# Patient Record
Sex: FEMALE | Race: ASIAN | NOT HISPANIC OR LATINO | Employment: OTHER | URBAN - METROPOLITAN AREA
[De-identification: names, ages, dates, MRNs, and addresses within clinical notes are randomized per-mention and may not be internally consistent; named-entity substitution may affect disease eponyms.]

---

## 2018-08-16 ENCOUNTER — OFFICE VISIT (OUTPATIENT)
Dept: FAMILY MEDICINE | Facility: CLINIC | Age: 63
End: 2018-08-16
Payer: MEDICAID

## 2018-08-16 VITALS
TEMPERATURE: 98.4 F | HEIGHT: 56 IN | WEIGHT: 205 LBS | SYSTOLIC BLOOD PRESSURE: 198 MMHG | DIASTOLIC BLOOD PRESSURE: 128 MMHG | HEART RATE: 85 BPM | BODY MASS INDEX: 46.12 KG/M2 | OXYGEN SATURATION: 96 %

## 2018-08-16 DIAGNOSIS — M17.0 OSTEOARTHRITIS OF BOTH KNEES, UNSPECIFIED OSTEOARTHRITIS TYPE: ICD-10-CM

## 2018-08-16 DIAGNOSIS — I10 ESSENTIAL HYPERTENSION, BENIGN: Primary | ICD-10-CM

## 2018-08-16 LAB
BUN SERPL-MCNC: 15 MG/DL (ref 7–30)
CALCIUM SERPL-MCNC: 9.6 MG/DL (ref 8.5–10.4)
CHLORIDE SERPLBLD-SCNC: 106 MMOL/L (ref 94–109)
CO2 SERPL-SCNC: 28 MMOL/L (ref 20–32)
CREAT SERPL-MCNC: 1 MG/DL (ref 0.6–1.3)
EGFR CALCULATED (BLACK REFERENCE): 71.6 ML/MIN
EGFR CALCULATED (NON BLACK REFERENCE): 59.1 ML/MIN
GLUCOSE SERPL-MCNC: 114 MG/DL (ref 60–109)
HCT VFR BLD AUTO: 45.7 % (ref 35–47)
HEMOGLOBIN: 14.8 G/DL (ref 11.7–15.7)
MCH RBC QN AUTO: 31.7 PG (ref 26.5–35)
MCHC RBC AUTO-ENTMCNC: 32.4 G/DL (ref 32–36)
MCV RBC AUTO: 97.9 FL (ref 78–100)
PLATELET # BLD AUTO: 137 K/UL (ref 150–450)
POTASSIUM SERPL-SCNC: 3.6 MMOL/L (ref 3.4–5.3)
RBC # BLD AUTO: 4.67 M/UL (ref 3.8–5.2)
SODIUM SERPL-SCNC: 142 MMOL/L (ref 133–144)
WBC # BLD AUTO: 9.2 K/UL (ref 4–11)

## 2018-08-16 RX ORDER — NAPROXEN 500 MG/1
500 TABLET ORAL 2 TIMES DAILY PRN
Qty: 30 TABLET | Refills: 1 | Status: SHIPPED | OUTPATIENT
Start: 2018-08-16 | End: 2018-09-13

## 2018-08-16 RX ORDER — LISINOPRIL AND HYDROCHLOROTHIAZIDE 12.5; 2 MG/1; MG/1
1 TABLET ORAL DAILY
Qty: 90 TABLET | Refills: 1 | Status: SHIPPED | OUTPATIENT
Start: 2018-08-16 | End: 2020-02-19

## 2018-08-16 NOTE — NURSING NOTE
Due to patient being non-English speaking/uses sign language, an  was used for this visit. Only for face-to-face interpretation by an external agency, date and length of interpretation can be found on the scanned worksheet.     name: carito weiner  Agency: [employer]  Language: Hmong   Telephone number: [phone]  Type of interpretation: Face-to-face, spoken    mammo and colonoscopy declined

## 2018-08-16 NOTE — MR AVS SNAPSHOT
After Visit Summary   8/16/2018    Konrad Mcarthur    MRN: [id number]           Patient Information     Date Of Birth          1955        Visit Information        Provider Department      8/16/2018 10:00 AM Nick Ramirez MD [facility]        Today's Diagnoses     Essential hypertension, benign    -  1    Osteoarthritis of both knees, unspecified osteoarthritis type          Care Instructions    Blood pressure-   Today you blood pressure is very high. We need to get this under control and prevent strokes and other problems in the future. We are sending a new medicine over to your pharmacy. Take 1 tablet in the morning every day    Knee pian-   We are prescribing naproxen for you knee pain. Take this as directed for 10 days and see how it feels     Follow up in 1 month for blood pressure recheck     ~~~~~~~~~~~~~~~~~~~~~~~~~~~~  Your medication list is printed, please keep this with you, it is helpful to bring this current list to any other medical appointments, the emergency room or hospital.    If you had lab testing today and your results are reassuring or normal they will be be mailed to you within 7 days.     If the lab tests need quick action we will call you with the results.   The phone number we will call with results is # [phone] (home) . If this is not the best number please call our clinic and change the number.    If you need any refills please call your pharmacy and they will contact us.    If you have any further concerns or wish to schedule another appointment you must call our office during normal business hours  [phone] (8-5:00 M-F)  If you have urgent medical questions that cannot wait  you may also call [phone] at any time of day.  If you have a medical emergency please call [phone].    Thank you for coming to [facility].            Follow-ups after your visit        Your next 10 appointments already scheduled     Sep 13, 2018 10:20 AM YVETTE  "  Return Visit with Nick Ramirez MD   [facility] ([facility])    [address] [place] [id number]   [phone]              Who to contact     Please call your clinic at [phone] to:    Ask questions about your health    Make or cancel appointments    Discuss your medicines    Learn about your test results    Speak to your doctor            Additional Information About Your Visit        Care EveryWhere ID     This is your Care EveryWhere ID. This could be used by other organizations to access your [facility] medical records  [id number]        Your Vitals Were     Pulse Temperature Height Pulse Oximetry BMI (Body Mass Index)       85 98.4  F (36.9  C) (Oral) 4' 8.3\" (143 cm) 96% 45.47 kg/m2        Blood Pressure from Last 3 Encounters:   08/16/18 (!) 198/128   06/08/16 (!) 178/110   05/24/16 (!) 162/102    Weight from Last 3 Encounters:   08/16/18 205 lb (93 kg)   06/08/16 198 lb 12.8 oz (90.2 kg)   05/24/16 197 lb 6.4 oz (89.5 kg)              We Performed the Following     Basic Metabolic Panel ([facility]) - Results < 1 hr     CBC with Plt ([facility])          Today's Medication Changes          These changes are accurate as of 8/16/18 11:14 AM.  If you have any questions, ask your nurse or doctor.               Start taking these medicines.        Dose/Directions    lisinopril-hydrochlorothiazide 20-12.5 MG per tablet   Commonly known as:  PRINZIDE/ZESTORETIC   Used for:  Essential hypertension, benign   Started by:  Nick aRmirez MD        Dose:  1 tablet   Take 1 tablet by mouth daily   Quantity:  90 tablet   Refills:  1       naproxen 500 MG tablet   Commonly known as:  NAPROSYN   Used for:  Osteoarthritis of both knees, unspecified osteoarthritis type   Started by:  Nick Ramirez MD        Dose:  500 mg   Take 1 tablet (500 mg) by mouth 2 times daily as needed for moderate pain   Quantity:  30 tablet   Refills:  1            Where to get your medicines      These " medications were sent to [facility] - [place], [place] - [address]  [address], [facility] [address]     Phone:  [phone]     lisinopril-hydrochlorothiazide 20-12.5 MG per tablet    naproxen 500 MG tablet                Primary Care Provider Office Phone # Fax #    Delmy Koenig [phone]       [facility] [address] [place] [address]        Equal Access to Services     SUAD LUCIA : Hadii aad ku hadasho Soomaali, waaxda luqadaha, qaybta kaalmada adeegyada, waxay idiin hayaan adeeg kharash laarmen penaloza. So [facility] [phone].    ATENCIÓN: Si habla español, tiene a paz disposición servicios gratuitos de asistencia lingüística. [facility] [phone].    We comply with applicable federal civil rights laws and [place] laws. We do not discriminate on the basis of race, color, national origin, age, disability, sex, sexual orientation, or gender identity.            Thank you!     Thank you for choosing [facility]  for your care. Our goal is always to provide you with excellent care. Hearing back from our patients is one way we can continue to improve our services. Please take a few minutes to complete the written survey that you may receive in the mail after your visit with us. Thank you!             Your Updated Medication List - Protect others around you: Learn how to safely use, store and throw away your medicines at www.disposemymeds.org.          This list is accurate as of 8/16/18 11:14 AM.  Always use your most recent med list.                   Brand Name Dispense Instructions for use Diagnosis    acetaminophen 325 MG tablet    TYLENOL    100 tablet    Take 2 tablets (650 mg) by mouth every 6 hours as needed for mild pain    Knee pain, unspecified laterality       amLODIPine 5 MG tablet    NORVASC    30 tablet    Take 1 tablet (5 mg) by mouth daily    Benign essential hypertension       hydrochlorothiazide 25 MG tablet    HYDRODIURIL    90  tablet    Take 1 tablet (25 mg) by mouth daily    Benign essential hypertension       lisinopril 40 MG tablet    PRINIVIL/ZESTRIL    90 tablet    Take 0.5 tablets (20 mg) by mouth daily    Benign essential hypertension       lisinopril-hydrochlorothiazide 20-12.5 MG per tablet    PRINZIDE/ZESTORETIC    90 tablet    Take 1 tablet by mouth daily    Essential hypertension, benign       naproxen 500 MG tablet    NAPROSYN    30 tablet    Take 1 tablet (500 mg) by mouth 2 times daily as needed for moderate pain    Osteoarthritis of both knees, unspecified osteoarthritis type       order for DME     1 Units    Equipment being ordered: blood pressure cuff, arm    Benign essential hypertension       order for DME     1 Units    Equipment being ordered: automatic BP cuff, pt cannot take manual BP    Benign essential hypertension

## 2018-08-16 NOTE — PROGRESS NOTES
Please call patient and send results letter  The lab tests suggest mild kidney disease as a result of the high blood pressure.

## 2018-08-16 NOTE — PROGRESS NOTES
HPI:       Konrad Mcarthur is a 63 year old  female who presents for bilateral knee pain.  Had a bilateral hysterectomy oophorectomy in [facility] in 2000. Took estrogen replacement for one year - no longer taking this.   Noted to have markedly elevated BP on rooming. Patient has history of HTN, but does not take her medicines. Patient does not speak English.  Information obtained through an interpretor.    Explained that the medicine needs to be taken regularly. Patient has been checking her blood pressure in the morning, and if it is not high, she does not take the medicine. She reports that her BP has been in the 140s, so she did not consider it high, and has not taken meds.Off meds for 1.5 years.      Has been having increased bilateral knee pain. This appears to be osteoarthritis, and associated with increased weight.   Vitals:    08/16/18 1013   Weight: 205 lb (93 kg)     More than 50% of this 30 minute visit was spent in counseling.  Counseling included discussion of medication use, hypertension, and prevention.               PMHX:   Current Medications:   Current Outpatient Prescriptions   Medication Sig Dispense Refill     acetaminophen (TYLENOL) 325 MG tablet Take 2 tablets (650 mg) by mouth every 6 hours as needed for mild pain 100 tablet 3     amLODIPine (NORVASC) 5 MG tablet Take 1 tablet (5 mg) by mouth daily 30 tablet 1     hydrochlorothiazide (HYDRODIURIL) 25 MG tablet Take 1 tablet (25 mg) by mouth daily 90 tablet 3     lisinopril (PRINIVIL,ZESTRIL) 40 MG tablet Take 0.5 tablets (20 mg) by mouth daily 90 tablet 3     order for DME Equipment being ordered: automatic BP cuff, pt cannot take manual BP 1 Units 0     order for DME Equipment being ordered: blood pressure cuff, arm 1 Units 0       Existing Problems  Patient Active Problem List   Diagnosis     Essential hypertension, benign     H/O: hysterectomy       Allergies:  Allergies   Allergen Reactions     Flu Virus Vaccine      Cough unstopped  "per pt when first received it in 2009, ever since then, don't get flu shot.       Previous labs:  Lab Results   Component Value Date    CHOL 188.0 11/13/2015    TRIG 185.0 (H) 11/13/2015    HDL 66.0 11/13/2015    .0 12/14/2015    BUN 13.0 12/14/2015    CO2 29.0 12/14/2015               Review of Systems:    CONSTITUTIONAL: no fatigue,   SKIN: no worrisome rashes, no worrisome moles, no worrisome lesions  EYES: no acute vision problems or changes  ENT: no ear problems, no mouth problems, no throat problems  RESP: no significant cough, no shortness of breath  CV: no chest pain, no palpitations, no new or worsening peripheral edema  GI: no nausea, no vomiting, no constipation, no diarrhea          Physical Exam:     Vitals:    08/16/18 1013   BP: (!) 198/128   Pulse: 85   Temp: 98.4  F (36.9  C)   TempSrc: Oral   SpO2: 96%   Weight: 205 lb (93 kg)   Height: 4' 8.3\" (143 cm)     Body mass index is 45.47 kg/(m^2).    GENERAL:alert, well hydrated, no distress  EYES: Eyes grossly normal to inspection, extraocular movements - intact, and PERRL  HENT: ear canals- normal; TMs- normal; Nose- normal; Mouth- no ulcers, no lesions  NECK: no tenderness, no adenopathy, no asymmetry, no masses, no stiffness;  RESP: lungs clear to auscultation - no rales, no rhonchi, no wheezes  CV: regular rates and rhythm, normal S1 S2, no S3 or S4 and no murmur, no click or rub -           Labs and Procedures     Office Visit on 12/14/2015   Component Date Value Ref Range Status     Glucose 12/14/2015 156.0* 60.0 - 109.0 mg/dL Final     Urea Nitrogen 12/14/2015 13.0  7.0 - 30.0 mg/dL Final     Creatinine 12/14/2015 0.7  0.6 - 1.3 mg/dL Final     Sodium 12/14/2015 134.0  133.0 - 144.0 mmol/L Final     Potassium 12/14/2015 3.5  3.4 - 5.3 mmol/L Final     Chloride 12/14/2015 104.0  94.0 - 109.0 mmol/L Final     Carbon Dioxide 12/14/2015 29.0  20.0 - 32.0 mmol/L Final     Calcium 12/14/2015 9.6  8.5 - 10.4 mg/dL Final     eGFR Calculated (Non " Black Referen* 12/14/2015 90.1  >60.0 mL/min Final     eGFR Calculated (Black Reference) 12/14/2015 109.0  >60.0 mL/min Final              Assessment and Plan     1.Marked hypertension in a patient who refuses to take her HTN medications.  Currently on three meds lisinopril, chlorthalidone, and norvasc, but not taking ay of these meds. Sometimes the patient takes one of the meds, differing in which one she takes,  according to her son.Will stop previous meds, and provide lisinopril-hcts combo - patient tien gree to take one pill per day.    2. Will check renal function, BMP.    3. Recheck in one month to consider increasing medication for BP control.  4. Patient came with her  and son.     Options for treatment and follow-up care were reviewed with the patient and/or guardian. Konradcary Mcarthur and/or guardian engaged in the decision making process and verbalized understanding of the options discussed and agreed with the final plan.    Nick Ramirez MD to consider increasing medication for BP control.

## 2018-08-16 NOTE — PATIENT INSTRUCTIONS
Blood pressure-   Today you blood pressure is very high. We need to get this under control and prevent strokes and other problems in the future. We are sending a new medicine over to your pharmacy. Take 1 tablet in the morning every day    Knee pian-   We are prescribing naproxen for you knee pain. Take this as directed for 10 days and see how it feels     Follow up in 1 month for blood pressure recheck     ~~~~~~~~~~~~~~~~~~~~~~~~~~~~  Your medication list is printed, please keep this with you, it is helpful to bring this current list to any other medical appointments, the emergency room or hospital.    If you had lab testing today and your results are reassuring or normal they will be be mailed to you within 7 days.     If the lab tests need quick action we will call you with the results.   The phone number we will call with results is # [phone] (home) . If this is not the best number please call our clinic and change the number.    If you need any refills please call your pharmacy and they will contact us.    If you have any further concerns or wish to schedule another appointment you must call our office during normal business hours  [phone] (8-5:00 M-F)  If you have urgent medical questions that cannot wait  you may also call [phone] at any time of day.  If you have a medical emergency please call [phone].    Thank you for coming to [facility].

## 2018-09-13 ENCOUNTER — OFFICE VISIT (OUTPATIENT)
Dept: FAMILY MEDICINE | Facility: CLINIC | Age: 63
End: 2018-09-13
Payer: MEDICAID

## 2018-09-13 VITALS
HEART RATE: 72 BPM | BODY MASS INDEX: 45.84 KG/M2 | HEIGHT: 56 IN | DIASTOLIC BLOOD PRESSURE: 116 MMHG | WEIGHT: 203.8 LBS | OXYGEN SATURATION: 98 % | TEMPERATURE: 97.8 F | SYSTOLIC BLOOD PRESSURE: 180 MMHG | RESPIRATION RATE: 18 BRPM

## 2018-09-13 DIAGNOSIS — M25.561 CHRONIC PAIN OF RIGHT KNEE: Primary | ICD-10-CM

## 2018-09-13 DIAGNOSIS — M17.0 OSTEOARTHRITIS OF BOTH KNEES, UNSPECIFIED OSTEOARTHRITIS TYPE: ICD-10-CM

## 2018-09-13 DIAGNOSIS — I10 BENIGN ESSENTIAL HYPERTENSION: ICD-10-CM

## 2018-09-13 DIAGNOSIS — G89.29 CHRONIC PAIN OF RIGHT KNEE: Primary | ICD-10-CM

## 2018-09-13 DIAGNOSIS — I10 ESSENTIAL HYPERTENSION, BENIGN: ICD-10-CM

## 2018-09-13 DIAGNOSIS — M17.11 OSTEOARTHRITIS OF RIGHT KNEE, UNSPECIFIED OSTEOARTHRITIS TYPE: ICD-10-CM

## 2018-09-13 RX ORDER — AMLODIPINE BESYLATE 5 MG/1
5 TABLET ORAL DAILY
Qty: 90 TABLET | Refills: 1 | Status: SHIPPED | OUTPATIENT
Start: 2018-09-13 | End: 2020-02-19

## 2018-09-13 RX ORDER — NAPROXEN 500 MG/1
500 TABLET ORAL 2 TIMES DAILY PRN
Qty: 30 TABLET | Refills: 1 | Status: SHIPPED | OUTPATIENT
Start: 2018-09-13 | End: 2020-02-19

## 2018-09-13 NOTE — NURSING NOTE
9/12/2018 PCS Previsit Plan   DUE FOR:  HIV screen  Hep C screen  Colon/FIT-declined  Advance directive-packet given  Mammogram-done already per pt, normal, unsure the exact location, will check CE  Fall risk assessment-done, no injury, no falls  DEXA-offered  [id number]  ANGELINE Real    Due to patient being non-English speaking/uses sign language, an  was used for this visit. Only for face-to-face interpretation by an external agency, date and length of interpretation can be found on the scanned worksheet.     name: Amrit Kat  Agency: [employer]  Language: tran   Telephone number: [phone]  Type of interpretation: Face-to-face, spoken

## 2018-09-13 NOTE — MR AVS SNAPSHOT
After Visit Summary   9/13/2018    Konrad Mcarthur    MRN: [id number]           Patient Information     Date Of Birth          1955        Visit Information        Provider Department      9/13/2018 10:20 AM Nick Ramirez MD [facility]        Today's Diagnoses     Chronic pain of right knee    -  1    Benign essential hypertension        Osteoarthritis of right knee, unspecified osteoarthritis type        Osteoarthritis of both knees, unspecified osteoarthritis type        Essential hypertension, benign        Class 3 obesity without serious comorbidity with body mass index (BMI) of 45.0 to 49.9 in adult, unspecified obesity type (H)          Care Instructions    ~Continue to take your medications  ~Go to physical therapy to help with using cane and pain knee      Your medication list is printed, please keep this with you, it is helpful to bring this current list to any other medical appointments, the emergency room or hospital.    If you had lab testing today and your results are reassuring or normal they will be be mailed to you within 7 days.     If the lab tests need quick action we will call you with the results.   The phone number we will call with results is # [phone] (home) . If this is not the best number please call our clinic and change the number.    If you need any refills please call your pharmacy and they will contact us.    If you have any further concerns or wish to schedule another appointment you must call our office during normal business hours  [phone] (8-5:00 M-F)  If you have urgent medical questions that cannot wait  you may also call [phone] at any time of day.  If you have a medical emergency please call [phone].    Thank you for coming to [facility].            Follow-ups after your visit        Additional Services     ORTHOPEDICS ADULT REFERRAL       Patient to stop at the [facility]    Reason for Referral: right knee pain  Referral  "Location: [facility]: [phone]     needed: Yes  Language: Hmong    May leave message on voicemail: Yes                  Who to contact     Please call your clinic at [phone] to:    Ask questions about your health    Make or cancel appointments    Discuss your medicines    Learn about your test results    Speak to your doctor            Additional Information About Your Visit        Care EveryWhere ID     This is your Care EveryWhere ID. This could be used by other organizations to access your [facility] medical records  [id number]        Your Vitals Were     Pulse Temperature Respirations Height Pulse Oximetry BMI (Body Mass Index)    72 97.8  F (36.6  C) (Oral) 18 4' 8.2\" (142.8 cm) 98% 45.36 kg/m2       Blood Pressure from Last 3 Encounters:   09/13/18 (!) 180/116   08/16/18 (!) 198/128   06/08/16 (!) 178/110    Weight from Last 3 Encounters:   09/13/18 203 lb 12.8 oz (92.4 kg)   08/16/18 205 lb (93 kg)   06/08/16 198 lb 12.8 oz (90.2 kg)              We Performed the Following     ORTHOPEDICS ADULT REFERRAL          Today's Medication Changes          These changes are accurate as of 9/13/18 11:47 AM.  If you have any questions, ask your nurse or doctor.               Start taking these medicines.        Dose/Directions    order for DME   Used for:  Benign essential hypertension   Started by:  Nick Ramirez MD        Equipment being ordered: blood pressure cuff   Quantity:  1 Units   Refills:  0       order for DME   Used for:  Chronic pain of right knee   Started by:  Nick Ramirez MD        Equipment being ordered: cane   Quantity:  1 Units   Refills:  0         Stop taking these medicines if you haven't already. Please contact your care team if you have questions.     hydrochlorothiazide 25 MG tablet   Commonly known as:  HYDRODIURIL   Stopped by:  Nick Ramirez MD           lisinopril 40 MG tablet   Commonly known as:  PRINIVIL/ZESTRIL   Stopped by:  Nick Ramirez " MD RAGHU                Where to get your medicines      These medications were sent to [facility] - [place], [place] - [address]  [address], [place] [facility] [address]     Phone:  [phone]     amLODIPine 5 MG tablet    naproxen 500 MG tablet         Some of these will need a paper prescription and others can be bought over the counter.  Ask your nurse if you have questions.     Bring a paper prescription for each of these medications     order for DME    order for DME                Primary Care Provider Office Phone #    Yen Becerra [phone]       [facility] [facility] [address]  [place] [address]        Equal Access to Services     [facility] Teres[facility] HospitalJUSTINE : Hadii victoria ku hadasho Soomaali, waaxda luqadaha, qaybta kaalmada adeegyada, olivia jordan . So [facility] [phone].    ATENCIÓN: Si habla español, tiene a paz disposición servicios gratuitos de asistencia lingüística. [facility] [phone].    We comply with applicable federal civil rights laws and [place] laws. We do not discriminate on the basis of race, color, national origin, age, disability, sex, sexual orientation, or gender identity.            Thank you!     Thank you for choosing [facility]  for your care. Our goal is always to provide you with excellent care. Hearing back from our patients is one way we can continue to improve our services. Please take a few minutes to complete the written survey that you may receive in the mail after your visit with us. Thank you!             Your Updated Medication List - Protect others around you: Learn how to safely use, store and throw away your medicines at www.disposemymeds.org.          This list is accurate as of 9/13/18 11:47 AM.  Always use your most recent med list.                   Brand Name Dispense Instructions for use Diagnosis    acetaminophen 325 MG tablet    TYLENOL    100 tablet    Take 2 tablets (650 mg) by mouth every 6 hours as needed for mild pain     Knee pain, unspecified laterality       amLODIPine 5 MG tablet    NORVASC    90 tablet    Take 1 tablet (5 mg) by mouth daily    Benign essential hypertension       lisinopril-hydrochlorothiazide 20-12.5 MG per tablet    PRINZIDE/ZESTORETIC    90 tablet    Take 1 tablet by mouth daily    Essential hypertension, benign, Osteoarthritis of both knees, unspecified osteoarthritis type, Class 3 obesity without serious comorbidity with body mass index (BMI) of 45.0 to 49.9 in adult, unspecified obesity type (H)       naproxen 500 MG tablet    NAPROSYN    30 tablet    Take 1 tablet (500 mg) by mouth 2 times daily as needed for moderate pain    Osteoarthritis of both knees, unspecified osteoarthritis type, Essential hypertension, benign, Class 3 obesity without serious comorbidity with body mass index (BMI) of 45.0 to 49.9 in adult, unspecified obesity type (H)       order for DME     1 Units    Equipment being ordered: blood pressure cuff, arm    Benign essential hypertension       order for DME     1 Units    Equipment being ordered: automatic BP cuff, pt cannot take manual BP    Benign essential hypertension       order for DME     1 Units    Equipment being ordered: blood pressure cuff    Benign essential hypertension       order for DME     1 Units    Equipment being ordered: cane    Chronic pain of right knee

## 2018-09-13 NOTE — PATIENT INSTRUCTIONS
~Continue to take your medications  ~Go to physical therapy to help with using cane and pain knee      Your medication list is printed, please keep this with you, it is helpful to bring this current list to any other medical appointments, the emergency room or hospital.    If you had lab testing today and your results are reassuring or normal they will be be mailed to you within 7 days.     If the lab tests need quick action we will call you with the results.   The phone number we will call with results is # [phone] (home) . If this is not the best number please call our clinic and change the number.    If you need any refills please call your pharmacy and they will contact us.    If you have any further concerns or wish to schedule another appointment you must call our office during normal business hours  [phone] (8-5:00 M-F)  If you have urgent medical questions that cannot wait  you may also call [phone] at any time of day.  If you have a medical emergency please call 911.    Thank you for coming to [facility].      Referral for ( TEST )  :      Orthopedics   LOCATION/PLACE/Provider :    [facility]  [address] [place] [id number]  DATE & TIME :     9- at 10:30am  PHONE :     [phone]  FAX :     [phone]  Appointment made by clinic staff/:    Lucia

## 2018-09-13 NOTE — PROGRESS NOTES
HPI:       Konrad Mcarthur is a 63 year old  female who presents for recheck of hypertension.   Patient does not speak English.  Information obtained through an interpretor.  Low literacy. Patient unsure wy she needs to take the medicine for BP on the days when she notes a BP of 135 at home. Requests a new BP machine.   Reports being compliant with the lisinopril/hct combination, but it is unclear how many days she takes it.  She checks her BP in the am, and it usually runs in the 130.s and 140's, and never 170.  Explained variation, and the reason for taking the medicine every day.   Complains of right knee pain. Hs taken the naproxen once per day, but found it didn't help and stopped.  Would like to pursue a knee injection.                PMHX:   Current Medications:   Current Outpatient Prescriptions   Medication Sig Dispense Refill     lisinopril-hydrochlorothiazide (PRINZIDE/ZESTORETIC) 20-12.5 MG per tablet Take 1 tablet by mouth daily 90 tablet 1     acetaminophen (TYLENOL) 325 MG tablet Take 2 tablets (650 mg) by mouth every 6 hours as needed for mild pain 100 tablet 3     amLODIPine (NORVASC) 5 MG tablet Take 1 tablet (5 mg) by mouth daily 30 tablet 1     hydrochlorothiazide (HYDRODIURIL) 25 MG tablet Take 1 tablet (25 mg) by mouth daily 90 tablet 3     lisinopril (PRINIVIL,ZESTRIL) 40 MG tablet Take 0.5 tablets (20 mg) by mouth daily 90 tablet 3     naproxen (NAPROSYN) 500 MG tablet Take 1 tablet (500 mg) by mouth 2 times daily as needed for moderate pain 30 tablet 1     order for DME Equipment being ordered: automatic BP cuff, pt cannot take manual BP 1 Units 0     order for DME Equipment being ordered: blood pressure cuff, arm 1 Units 0       Existing Problems  Patient Active Problem List   Diagnosis     Essential hypertension, benign     H/O: hysterectomy       Allergies:  Allergies   Allergen Reactions     Flu Virus Vaccine      Cough unstopped per pt when first received it in 2009, ever since  "then, don't get flu shot.       Previous labs:  Lab Results   Component Value Date    HGB 14.8 08/16/2018    HCT 45.7 08/16/2018    CHOL 188.0 11/13/2015    TRIG 185.0 (H) 11/13/2015    HDL 66.0 11/13/2015    .0 08/16/2018    BUN 15.0 08/16/2018    CO2 28.0 08/16/2018               Review of Systems:    CONSTITUTIONAL: no fatigue  SKIN: no worrisome rashes, no worrisome moles, no worrisome lesions  EYES: no acute vision problems or changes  ENT: no ear problems, no mouth problems, no throat problems  RESP: no significant cough, no shortness of breath  CV: no chest pain, no palpitations, no new or worsening peripheral edema  GI: no nausea, no vomiting, no constipation, no diarrhea          Physical Exam:     Vitals:    09/13/18 1030 09/13/18 1035   BP: (!) 177/116 (!) 180/116   Pulse: 72    Resp: 18    Temp: 97.8  F (36.6  C)    TempSrc: Oral    SpO2: 98%    Weight: 203 lb 12.8 oz (92.4 kg)    Height: 4' 8.2\" (142.8 cm)      Body mass index is 45.36 kg/(m^2).    GENERAL:alert, well hydrated, no distress  EYES: Eyes grossly normal to inspection, extraocular movements - intact, and PERRL  HENT: ear canals- normal; TMs- normal; Nose- normal; Mouth- no ulcers, no lesions  NECK: no tenderness, no adenopathy, no asymmetry, no masses, no stiffness; thyroid- normal to palpation  RESP: lungs clear to auscultation - no rales, no rhonchi, no wheezes  CV: regular rates and rhythm, normal S1 S2, no S3 or S4 and no murmur, no click or rub -  Knees: bilateral knee pain, worse on the right. Mild joint oine tenderness. No effusion. Pin is otherwise nonlocalizing. Worse in the morning, better during the day.              Labs and Procedures     Office Visit on 08/16/2018   Component Date Value Ref Range Status     Glucose 08/16/2018 114.0* 60.0 - 109.0 mg/dL Final     Urea Nitrogen 08/16/2018 15.0  7.0 - 30.0 mg/dL Final     Creatinine 08/16/2018 1.0  0.6 - 1.3 mg/dL Final     Sodium 08/16/2018 142.0  133.0 - 144.0 mmol/L Final "     Potassium 08/16/2018 3.6  3.4 - 5.3 mmol/L Final     Chloride 08/16/2018 106.0  94.0 - 109.0 mmol/L Final     Carbon Dioxide 08/16/2018 28.0  20.0 - 32.0 mmol/L Final     Calcium 08/16/2018 9.6  8.5 - 10.4 mg/dL Final     eGFR Calculated (Non Black Referen* 08/16/2018 59.1* >60.0 mL/min Final     eGFR Calculated (Black Reference) 08/16/2018 71.6  >60.0 mL/min Final     WBC 08/16/2018 9.2  4.0 - 11.0 K/uL Final     RBC 08/16/2018 4.67  3.80 - 5.20 M/uL Final     Hemoglobin 08/16/2018 14.8  11.7 - 15.7 g/dL Final     Hematocrit 08/16/2018 45.7  35.0 - 47.0 % Final     MCV 08/16/2018 97.9  78.0 - 100.0 fL Final     MCH 08/16/2018 31.7  26.5 - 35.0 pg Final     MCHC 08/16/2018 32.4  32.0 - 36.0 g/dL Final     Platelets 08/16/2018 137.0* 150.0 - 450.0 K/uL Final              Assessment and Plan     1.Refer to ortho for possible knee injection. Appears conisistent with osteoarthritis.   2. Discussed BP control. Have discussed the need to take the pills, but unclear if patient understands.  3. Suggested mild weight loss.       Options for treatment and follow-up care were reviewed with the patient and/or guardian. Konrad Mcarthur and/or guardian engaged in the decision making process and verbalized understanding of the options discussed and agreed with the final plan.    Nick Ramirez MD

## 2018-09-14 ENCOUNTER — TELEPHONE (OUTPATIENT)
Dept: FAMILY MEDICINE | Facility: CLINIC | Age: 63
End: 2018-09-14

## 2018-09-14 NOTE — TELEPHONE ENCOUNTER
I got a message from Lucia our referral specialist about helping the pt setup a ride for her [facility] appointment for 09/21/18 at 10:30am. I called [employer] and was able to setup a ride for the pt.  I was able to setup a ride for the pt with [employer] [phone].

## 2018-10-04 PROBLEM — M17.0 PRIMARY OSTEOARTHRITIS OF BOTH KNEES: Status: ACTIVE | Noted: 2018-10-04

## 2019-02-26 DIAGNOSIS — I10 BENIGN ESSENTIAL HYPERTENSION: ICD-10-CM

## 2019-02-26 RX ORDER — AMLODIPINE BESYLATE 5 MG/1
5 TABLET ORAL DAILY
Qty: 90 TABLET | OUTPATIENT
Start: 2019-02-26

## 2020-02-18 NOTE — PROGRESS NOTES
"       Subjective:   Konrad Mcarthur is a 64 year old year old female who presents to clinic today for the following health issues:    Chief Complaint   Patient presents with     RECHECK     Knee pain, needs referral for injection     Medication Reconciliation     needs attention     Bilateral knee pain:  Referred to ortho in 2018 for bilateral knee OA and pain. Received steroid injection by them in 9/2018, with relief until 5/2019. Has been taking aspirin as needed with some relief. Worse with stairs and upon getting up after sitting for a long amount of time. Pain located \"within the bones\" of both knees.     Bilateral wrist pain:  Along thumbs into wrists on both hands. Doesn't work with hands much, her grandsons do most work with her. Does not limit her activity. Had a friend that got an injection that helped her.     HTN:  Previously had been on amlodipine and lisinopril-hydrochlorothiazide. Has been without these medications for about 1 year. Was in [place] because her  was ill and passed, and then was without insurance.     A [employer]  was used for this visit         PMHX:   PAST MEDICAL HISTORY:  Patient Active Problem List   Diagnosis     Essential hypertension, benign     H/O: hysterectomy     Primary osteoarthritis of both knees     CURRENT MEDICATIONS:  Current Outpatient Medications   Medication Sig Dispense Refill     hydrochlorothiazide 25 MG PO tablet Take 1 tablet (25 mg) by mouth daily 30 tablet 0     naproxen 375 MG PO tablet Take 1 tablet (375 mg) by mouth 2 times daily (with meals) 60 tablet 0     ALLERGIES:     Allergies   Allergen Reactions     Flu Virus Vaccine      Cough unstopped per pt when first received it in 2009, ever since then, don't get flu shot.          Objective:     Vitals:    02/19/20 1051   BP: (!) 173/122   Pulse: 77   Resp: 20   Temp: 98  F (36.7  C)   SpO2: 97%   Weight: 104.8 kg (231 lb)   Height: 1.4 m (4' 7.12\")     Body mass index is 53.46 kg/m .  Results " for orders placed or performed in visit on 02/19/20 (from the past 24 hour(s))   Hemoglobin A1c ([facility])   Result Value Ref Range    Hemoglobin A1C 5.4 4.1 - 5.7 %       General: Alert, well-appearing female in NAD  Hand: pain along extensor pollicis longus tendon b/l with + finkelsteins   Pulm: CTA BL, no tachypnea  CV: RRR, no murmur  Knee: +bilateral patellar grind, no notable effusion or tenderness  Psych: Mood appropriate to visit content, full affect, rational thought content and process    Discussed risks and benefits of intarticular knee injection, consent form signed. Injection was performed at bilateral knees via a lateral approach using 4 ml 1% plain Lidocaine and 1 ml 40 mg of Kenalog each. This was well tolerated.    Assessment and Plan:   1. Primary osteoarthritis of both knees  Steroid injection performed to bilateral knees. Discussed importance of weight loss to improve ongoing symptoms.   - triamcinolone (KENALOG-40) injection 40 mg  - triamcinolone (KENALOG-40) injection 40 mg  - DRAIN/INJECT LARGE JOINT/BURSA  - DRAIN/INJECT LARGE JOINT/BURSA    2. Bilateral tenosynovitis, de Quervain  Patient interested in steroid injection, though recommended waiting since injections performed on bilateral knees today as discussed above. Given symptoms do not seem to prevent her from performing ADLs will trial naproxen for now.   - naproxen 375 MG PO tablet; Take 1 tablet (375 mg) by mouth 2 times daily (with meals)  Dispense: 60 tablet; Refill: 0    3. Essential hypertension, benign  BP uncontrolled, has been without medications for over a year. Previously had been on 3 drug regimen but will start with one and then escalate as needed. Follow up in 2 weeks for repeat BP and blood work.   - hydrochlorothiazide 25 MG PO tablet; Take 1 tablet (25 mg) by mouth daily  Dispense: 30 tablet; Refill: 0  - Lipid [place] ([facility]) - Results > 1 hr  - Basic Metabolic Profile ([facility])    4. Class 3 severe obesity due  to excess calories without serious comorbidity with body mass index (BMI) of 50.0 to 59.9 in adult (H)  Discussed importance of healthy diet and weight loss. Hgb A1c normal at 5.4%. Lipids pending.   - Hemoglobin A1c ([facility])  - Lipid [place] ([facility]) - Results > 1 hr    5. Healthcare maintenance  - Hepatitis C Antibody ([facility])  - Flu vaccine given    Options for treatment and follow-up care were reviewed with the patient and/or guardian. Konrad Mcarthur and/or guardian engaged in the decision making process and verbalized understanding of the options discussed and agreed with the final plan.    Yen Becerra DO  [facility] Family Medicine Resident    Precepted with: Tita Dinero DO

## 2020-02-19 ENCOUNTER — TELEPHONE (OUTPATIENT)
Dept: FAMILY MEDICINE | Facility: CLINIC | Age: 65
End: 2020-02-19

## 2020-02-19 ENCOUNTER — OFFICE VISIT (OUTPATIENT)
Dept: FAMILY MEDICINE | Facility: CLINIC | Age: 65
End: 2020-02-19
Payer: COMMERCIAL

## 2020-02-19 VITALS
HEART RATE: 77 BPM | BODY MASS INDEX: 53.46 KG/M2 | TEMPERATURE: 98 F | HEIGHT: 55 IN | RESPIRATION RATE: 20 BRPM | OXYGEN SATURATION: 97 % | SYSTOLIC BLOOD PRESSURE: 173 MMHG | WEIGHT: 231 LBS | DIASTOLIC BLOOD PRESSURE: 122 MMHG

## 2020-02-19 DIAGNOSIS — E66.813 CLASS 3 SEVERE OBESITY DUE TO EXCESS CALORIES WITHOUT SERIOUS COMORBIDITY WITH BODY MASS INDEX (BMI) OF 50.0 TO 59.9 IN ADULT (H): ICD-10-CM

## 2020-02-19 DIAGNOSIS — M65.4 TENOSYNOVITIS, DE QUERVAIN: ICD-10-CM

## 2020-02-19 DIAGNOSIS — E66.01 CLASS 3 SEVERE OBESITY DUE TO EXCESS CALORIES WITHOUT SERIOUS COMORBIDITY WITH BODY MASS INDEX (BMI) OF 50.0 TO 59.9 IN ADULT (H): ICD-10-CM

## 2020-02-19 DIAGNOSIS — Z00.00 HEALTHCARE MAINTENANCE: ICD-10-CM

## 2020-02-19 DIAGNOSIS — I10 ESSENTIAL HYPERTENSION, BENIGN: ICD-10-CM

## 2020-02-19 DIAGNOSIS — M17.0 PRIMARY OSTEOARTHRITIS OF BOTH KNEES: Primary | ICD-10-CM

## 2020-02-19 LAB
ANION GAP SERPL CALCULATED.3IONS-SCNC: 13 MMOL/L (ref 5–18)
BUN SERPL-MCNC: 17 MG/DL (ref 8–22)
CALCIUM SERPL-MCNC: 10 MG/DL (ref 8.5–10.5)
CHLORIDE SERPL-SCNC: 104 MMOL/L (ref 98–107)
CHOLEST SERPL-MCNC: 219 MG/DL
CO2 SERPL-SCNC: 24 MMOL/L (ref 22–31)
CREAT SERPL-MCNC: 0.72 MG/DL (ref 0.6–1.1)
FASTING?: ABNORMAL
GLUCOSE SERPL-MCNC: 86 MG/DL (ref 70–125)
HBA1C MFR BLD: 5.4 % (ref 4.1–5.7)
HDLC SERPL-MCNC: 75 MG/DL
LDLC SERPL CALC-MCNC: 118 MG/DL
POTASSIUM SERPL-SCNC: 4.2 MMOL/L (ref 3.5–5)
SODIUM SERPL-SCNC: 141 MMOL/L (ref 136–145)
TRIGL SERPL-MCNC: 129 MG/DL

## 2020-02-19 RX ORDER — TRIAMCINOLONE ACETONIDE 40 MG/ML
40 INJECTION, SUSPENSION INTRA-ARTICULAR; INTRAMUSCULAR ONCE
Status: COMPLETED | OUTPATIENT
Start: 2020-02-19 | End: 2020-02-19

## 2020-02-19 RX ORDER — HYDROCHLOROTHIAZIDE 25 MG/1
25 TABLET ORAL DAILY
Qty: 30 TABLET | Refills: 0 | Status: SHIPPED | OUTPATIENT
Start: 2020-02-19 | End: 2020-03-04

## 2020-02-19 RX ADMIN — TRIAMCINOLONE ACETONIDE 40 MG: 40 INJECTION, SUSPENSION INTRA-ARTICULAR; INTRAMUSCULAR at 12:30

## 2020-02-19 RX ADMIN — TRIAMCINOLONE ACETONIDE 40 MG: 40 INJECTION, SUSPENSION INTRA-ARTICULAR; INTRAMUSCULAR at 12:33

## 2020-02-19 ASSESSMENT — PATIENT HEALTH QUESTIONNAIRE - PHQ9: SUM OF ALL RESPONSES TO PHQ QUESTIONS 1-9: 15

## 2020-02-19 ASSESSMENT — MIFFLIN-ST. JEOR: SCORE: 1431.81

## 2020-02-19 NOTE — NURSING NOTE
Colonoscopy/fit - Declined   Mammogram - Done per pt, will obtained record    Due to patient being non-English speaking/uses sign language, an  was used for this visit. Only for face-to-face interpretation by an external agency, date and length of interpretation can be found on the scanned worksheet.     name: Priscilla  Agency: [employer]  Language: Alyssa   Telephone number: [phone]  Type of interpretation: Face-to-face, spoken

## 2020-02-19 NOTE — TELEPHONE ENCOUNTER
I got a message from one of the MA, she wanted me to help setup a ride for the pt. The pt has a follow up appointment with  on 03/04/2020 at 2:00pm.  I called [employer] to setup this ride for the pt. The pt will be riding with Aaron Porter [phone].

## 2020-02-20 LAB — HCV AB SER QL: NEGATIVE

## 2020-02-20 RX ORDER — ATORVASTATIN CALCIUM 10 MG/1
10 TABLET, FILM COATED ORAL DAILY
Qty: 90 TABLET | Refills: 1 | Status: SHIPPED | OUTPATIENT
Start: 2020-02-20 | End: 2022-09-12

## 2020-02-25 NOTE — PROGRESS NOTES
Preceptor Attestation:   Patient seen, evaluated and discussed with the resident. I was present for and supervised the entire procedure. I have verified the content of the note, which accurately reflects my assessment of the patient and the plan of care.  Supervising Physician:Tita Dinero DO [facility]

## 2020-03-03 NOTE — PROGRESS NOTES
"       Subjective:   Konrad Mcarthur is a 64 year old year old female who presents to clinic today for the following health issues:    Chief Complaint   Patient presents with     RECHECK     BP and stills elevated     Medication Reconciliation     Last seen 2/19, restarted on hydrochlorothiazide 25 mg daily. ASCVD 10.2%, currently on atorvastatin 10 mg daily. Had previously been on lisinopril-hydrochlorothiazide and amlodipine. States concern for white coat HTN because she had a traumatic experience back home in which she had a surgery performed without anesthesia.     Not currently checking blood pressures at home. No CP, SOB, HA's.     A [employer]  was used for this visit         PMHX:   PAST MEDICAL HISTORY:  Patient Active Problem List   Diagnosis     Essential hypertension, benign     H/O: hysterectomy     Primary osteoarthritis of both knees     CURRENT MEDICATIONS:  Current Outpatient Medications   Medication Sig Dispense Refill     atorvastatin 10 MG PO tablet Take 1 tablet (10 mg) by mouth daily 90 tablet 1     lisinopril (ZESTRIL) 10 MG tablet Take 1 tablet (10 mg) by mouth daily 30 tablet 0     naproxen 375 MG PO tablet Take 1 tablet (375 mg) by mouth 2 times daily (with meals) 60 tablet 0     order for DME Equipment being ordered: Blood pressure cuff 1 Units 0     potassium chloride ER (K-TAB) 20 MEQ CR tablet Take 1 tablet (20 mEq) by mouth 2 times daily for 3 days 6 tablet 0     ALLERGIES:     Allergies   Allergen Reactions     Flu Virus Vaccine      Cough unstopped per pt when first received it in 2009, ever since then, don't get flu shot.          Objective:     Vitals:    03/04/20 1406 03/04/20 1444   BP: (!) 165/107 (!) 161/87   Pulse: 84    Resp: 20    Temp: 98.1  F (36.7  C)    SpO2: 98%    Weight: 83.6 kg (184 lb 6.4 oz)    Height: 1.397 m (4' 7\")      Body mass index is 42.86 kg/m .  Results for orders placed or performed in visit on 03/04/20 (from the past 24 hour(s))   Basic Metabolic " Panel ([facility])  - Results < 1 hr   Result Value Ref Range    Glucose 105.0 60.0 - 109.0 mg/dL    Urea Nitrogen 23.0 7.0 - 30.0 mg/dL    Creatinine 0.9 0.6 - 1.3 mg/dL    Sodium 139.0 133.0 - 144.0 mmol/L    Potassium 2.5 (LL) 3.4 - 5.3 mmol/L    Chloride 98.0 94.0 - 109.0 mmol/L    Carbon Dioxide 29.0 20.0 - 32.0 mmol/L    Calcium 10.1 8.5 - 10.4 mg/dL    eGFR Calculated (Non Black Reference) 66.6 >60.0 mL/min    eGFR Calculated (Black Reference) 80.6 >60.0 mL/min    Narrative    Result verified by repeat analysis, Critical Value was reported to and read   back by Dr Becerra  at 3/4/2020 3:44 PM ([facility])       General: Alert, well-appearing female in NAD  Pulm: CTA BL, no tachypnea  CV: RRR, no murmur  Psych: Mood appropriate to visit content, full affect, rational thought content and process    Assessment and Plan:   1. Essential hypertension, benign  BP above goal of 150/90, initially had planned to continue hydrochlorothiazide and add lisinopril. However, blood work returned with low potassium therefore will need to stop hydrochlorothiazide and continue lisinopril alone with plan to titrate up as needed. Patient agreeable to check BPs at home (patient concern for white coat HTN) and bring log with to next visit. Follow up with BMP in 4 weeks.   - Basic Metabolic Panel ([facility])  - Results < 1 hr  - Home Blood Pressure Monitor  - order for DME; Equipment being ordered: Blood pressure cuff  Dispense: 1 Units; Refill: 0  - lisinopril (ZESTRIL) 10 MG tablet; Take 1 tablet (10 mg) by mouth daily  Dispense: 30 tablet; Refill: 0    2. Need for vaccination  Agreeable to PCV13, declined flu.     3. Hypokalemia  Potassium low at 2.5 since starting hydrochlorothiazide. Will stop hydrochlorothiazide and start lisinopril. Will also had a few days of KCl replacement and have patient return in 2 weeks for recheck.   - potassium chloride ER (K-TAB) 20 MEQ CR tablet; Take 1 tablet (20 mEq) by mouth 2 times daily for 3 days  Dispense:  6 tablet; Refill: 0      Options for treatment and follow-up care were reviewed with the patient and/or guardian. Konrad Mcarthur and/or guardian engaged in the decision making process and verbalized understanding of the options discussed and agreed with the final plan.    Yen Becerra DO  [facility] Family Medicine Resident    Precepted with: Seth Beverly MD

## 2020-03-04 ENCOUNTER — OFFICE VISIT (OUTPATIENT)
Dept: FAMILY MEDICINE | Facility: CLINIC | Age: 65
End: 2020-03-04
Payer: COMMERCIAL

## 2020-03-04 VITALS
WEIGHT: 184.4 LBS | RESPIRATION RATE: 20 BRPM | BODY MASS INDEX: 42.67 KG/M2 | HEART RATE: 84 BPM | TEMPERATURE: 98.1 F | OXYGEN SATURATION: 98 % | DIASTOLIC BLOOD PRESSURE: 87 MMHG | SYSTOLIC BLOOD PRESSURE: 161 MMHG | HEIGHT: 55 IN

## 2020-03-04 DIAGNOSIS — I10 ESSENTIAL HYPERTENSION, BENIGN: Primary | ICD-10-CM

## 2020-03-04 DIAGNOSIS — E87.6 HYPOKALEMIA: ICD-10-CM

## 2020-03-04 DIAGNOSIS — Z23 NEED FOR VACCINATION: ICD-10-CM

## 2020-03-04 LAB
BUN SERPL-MCNC: 23 MG/DL (ref 7–30)
CALCIUM SERPL-MCNC: 10.1 MG/DL (ref 8.5–10.4)
CHLORIDE SERPLBLD-SCNC: 98 MMOL/L (ref 94–109)
CO2 SERPL-SCNC: 29 MMOL/L (ref 20–32)
CREAT SERPL-MCNC: 0.9 MG/DL (ref 0.6–1.3)
EGFR CALCULATED (BLACK REFERENCE): 80.6 ML/MIN
EGFR CALCULATED (NON BLACK REFERENCE): 66.6 ML/MIN
GLUCOSE SERPL-MCNC: 105 MG/DL (ref 60–109)
POTASSIUM SERPL-SCNC: 2.5 MMOL/L (ref 3.4–5.3)
SODIUM SERPL-SCNC: 139 MMOL/L (ref 133–144)

## 2020-03-04 RX ORDER — POTASSIUM CHLORIDE 1500 MG/1
20 TABLET, EXTENDED RELEASE ORAL 2 TIMES DAILY
Qty: 6 TABLET | Refills: 0 | Status: SHIPPED | OUTPATIENT
Start: 2020-03-04 | End: 2020-03-07

## 2020-03-04 RX ORDER — LISINOPRIL 10 MG/1
10 TABLET ORAL DAILY
Qty: 30 TABLET | Refills: 0 | Status: SHIPPED | OUTPATIENT
Start: 2020-03-04 | End: 2021-10-08

## 2020-03-04 RX ORDER — HYDROCHLOROTHIAZIDE 25 MG/1
25 TABLET ORAL DAILY
Qty: 30 TABLET | Refills: 0 | Status: SHIPPED | OUTPATIENT
Start: 2020-03-04 | End: 2020-03-04

## 2020-03-04 ASSESSMENT — MIFFLIN-ST. JEOR: SCORE: 1218.56

## 2020-03-04 NOTE — PROGRESS NOTES
Preceptor Attestation:   Patient seen, evaluated and discussed with the resident. I have verified the content of the note, which accurately reflects my assessment of the patient and the plan of care.  Supervising Physician:Seth Beverly MD  [facility]

## 2020-03-04 NOTE — Clinical Note
Please CALL patient: Your potassium is too low (likely because of the hydrochlorothiazine medicine). Because of this we need to STOP the hydrochlorothiazine and have you only take the lisinopril (which has been sent to the pharmacy). I have also sent in a potassium replacement (twice daily for 3 days). Schedule follow up in 2 weeks for recheck of blood work. Javier Cho, can you also call the pharmacy and cancel the refill of the hydrochlorothiazide so she doesn't accidentally pick it up/take it?? Thanks!

## 2020-03-04 NOTE — NURSING NOTE
Due to patient being non-English speaking/uses sign language, an  was used for this visit. Only for face-to-face interpretation by an external agency, date and length of interpretation can be found on the scanned worksheet.     name: Elmer Kat  Agency: [employer]  Language: Demetrisong   Telephone number: [phone]  Type of interpretation: Face-to-face, spoken   Colonoscopy/fit - Declined   Mammogram - Done per pt

## 2020-03-20 ENCOUNTER — TELEPHONE (OUTPATIENT)
Dept: FAMILY MEDICINE | Facility: CLINIC | Age: 65
End: 2020-03-20

## 2020-03-20 NOTE — TELEPHONE ENCOUNTER
I got a message from one of the  about the pt. The pt has an appointment with  on 03/25/2020 at 3:00pm.  I called [employer] to setup a ride for the pt.  The pt will be riding with [employer] [phone].

## 2020-03-25 ENCOUNTER — OFFICE VISIT (OUTPATIENT)
Dept: FAMILY MEDICINE | Facility: CLINIC | Age: 65
End: 2020-03-25
Payer: COMMERCIAL

## 2020-03-25 VITALS
BODY MASS INDEX: 43.88 KG/M2 | HEIGHT: 55 IN | DIASTOLIC BLOOD PRESSURE: 99 MMHG | TEMPERATURE: 97.8 F | HEART RATE: 80 BPM | SYSTOLIC BLOOD PRESSURE: 159 MMHG | OXYGEN SATURATION: 99 % | RESPIRATION RATE: 20 BRPM | WEIGHT: 189.6 LBS

## 2020-03-25 DIAGNOSIS — E87.6 HYPOKALEMIA: ICD-10-CM

## 2020-03-25 DIAGNOSIS — I10 ESSENTIAL HYPERTENSION, BENIGN: Primary | ICD-10-CM

## 2020-03-25 LAB
BUN SERPL-MCNC: 16 MG/DL (ref 7–30)
CALCIUM SERPL-MCNC: 9.4 MG/DL (ref 8.5–10.4)
CHLORIDE SERPLBLD-SCNC: 102 MMOL/L (ref 94–109)
CO2 SERPL-SCNC: 28 MMOL/L (ref 20–32)
CREAT SERPL-MCNC: 0.7 MG/DL (ref 0.6–1.3)
EGFR CALCULATED (BLACK REFERENCE): >90 ML/MIN
EGFR CALCULATED (NON BLACK REFERENCE): 89 ML/MIN
GLUCOSE SERPL-MCNC: 124 MG/DL (ref 60–109)
POTASSIUM SERPL-SCNC: 3.8 MMOL/L (ref 3.4–5.3)
SODIUM SERPL-SCNC: 142 MMOL/L (ref 133–144)

## 2020-03-25 RX ORDER — LISINOPRIL 20 MG/1
20 TABLET ORAL DAILY
Qty: 90 TABLET | Refills: 1 | Status: SHIPPED | OUTPATIENT
Start: 2020-03-25 | End: 2021-10-08

## 2020-03-25 ASSESSMENT — MIFFLIN-ST. JEOR: SCORE: 1242.15

## 2020-03-25 NOTE — PROGRESS NOTES
Preceptor Attestation:   Patient seen, evaluated and discussed with the resident. I have verified the content of the note, which accurately reflects my assessment of the patient and the plan of care.  Supervising Physician:Marcela Mdaera MD  [facility]

## 2020-03-25 NOTE — PROGRESS NOTES
"       Subjective:   Konrad Mcarthur is a 64 year old year old female who presents to clinic today for the following health issues:    Chief Complaint   Patient presents with     RECHECK     BP and Lab     Medication Reconciliation     HTN:  Home BPs: unable to get BP cuff because insurance would not cover it.   Meds: lisinopril 10 mg daily, only has 4 pills left  No CP, SOB, HA, dizziness, lightheadedness.     Low potassium:  Stopped hydrochlorothiazide and has since completed potassium supplement.    A [employer]  was used for this visit         PMHX:   PAST MEDICAL HISTORY:  Patient Active Problem List   Diagnosis     Essential hypertension, benign     H/O: hysterectomy     Primary osteoarthritis of both knees     CURRENT MEDICATIONS:  Current Outpatient Medications   Medication Sig Dispense Refill     atorvastatin 10 MG PO tablet Take 1 tablet (10 mg) by mouth daily 90 tablet 1     lisinopril (ZESTRIL) 10 MG tablet Take 1 tablet (10 mg) by mouth daily 30 tablet 0     lisinopril (ZESTRIL) 20 MG tablet Take 1 tablet (20 mg) by mouth daily 90 tablet 1     naproxen 375 MG PO tablet Take 1 tablet (375 mg) by mouth 2 times daily (with meals) 60 tablet 0     order for DME Equipment being ordered: Blood pressure cuff 1 Units 0     ALLERGIES:     Allergies   Allergen Reactions     Flu Virus Vaccine      Cough unstopped per pt when first received it in 2009, ever since then, don't get flu shot.          Objective:     Vitals:    03/25/20 1450 03/25/20 1527   BP: (!) 154/90 (!) 159/99   Pulse: 80    Resp: 20    Temp: 97.8  F (36.6  C)    SpO2: 99%    Weight: 86 kg (189 lb 9.6 oz)    Height: 1.397 m (4' 7\")      Body mass index is 44.07 kg/m .  Results for orders placed or performed in visit on 03/25/20 (from the past 24 hour(s))   Basic Metabolic Panel ([facility]) - Results < 1 hr   Result Value Ref Range    Glucose 124.0 (H) 60.0 - 109.0 mg/dL    Urea Nitrogen 16.0 7.0 - 30.0 mg/dL    Creatinine 0.7 0.6 - 1.3 mg/dL    " Sodium 142.0 133.0 - 144.0 mmol/L    Potassium 3.8 3.4 - 5.3 mmol/L    Chloride 102.0 94.0 - 109.0 mmol/L    Carbon Dioxide 28.0 20.0 - 32.0 mmol/L    Calcium 9.4 8.5 - 10.4 mg/dL    eGFR Calculated (Non Black Reference) 89.0 >60.0 mL/min    eGFR Calculated (Black Reference) >90 >60.0 mL/min       General: Alert, well-appearing female in NAD  Pulm: CTA BL, no tachypnea  CV: RRR, no murmur  Psych: Mood appropriate to visit content, full affect, rational thought content and process    Assessment and Plan:   1. Essential hypertension, benign  BP remains elevated above goal of 150/90. Will increase lisinopril to 20 mg daily and have patient return in 2 weeks for lab only visit to recheck renal functions/electrolytes. She will notify us if she experiences any hypotensive symptoms.   - Basic Metabolic Panel ([facility]) - Results < 1 hr  - lisinopril (ZESTRIL) 20 MG tablet; Take 1 tablet (20 mg) by mouth daily  Dispense: 90 tablet; Refill: 1  - Basic Metabolic Panel ([facility]) - Results < 1 hr; Future    2. Hypokalemia  Potassium improved since stopping hydrochlorothiazide and completing short course of potassium supplement.   - Basic Metabolic Panel ([facility]) - Results < 1 hr    Options for treatment and follow-up care were reviewed with the patient and/or guardian. Konradcary Mcarthur and/or guardian engaged in the decision making process and verbalized understanding of the options discussed and agreed with the final plan.    Yen Becerra DO  [facility] Family Medicine Resident    Precepted with: Dr. Madera

## 2020-03-25 NOTE — NURSING NOTE
Colonoscopy/fit - Declined   Per pt had mammogram done already  Due to patient being non-English speaking/uses sign language, an  was used for this visit. Only for face-to-face interpretation by an external agency, date and length of interpretation can be found on the scanned worksheet.     name: Neil ID #[id number] ([place])   Agency: [employer] ([employer] only)  Language: Alyssa   Telephone number: NA  Type of interpretation: Telemedicine, spoken

## 2021-10-08 ENCOUNTER — OFFICE VISIT (OUTPATIENT)
Dept: FAMILY MEDICINE | Facility: CLINIC | Age: 66
End: 2021-10-08
Payer: MEDICARE

## 2021-10-08 VITALS
HEART RATE: 72 BPM | SYSTOLIC BLOOD PRESSURE: 160 MMHG | DIASTOLIC BLOOD PRESSURE: 100 MMHG | WEIGHT: 203.4 LBS | BODY MASS INDEX: 45.75 KG/M2 | HEIGHT: 56 IN

## 2021-10-08 DIAGNOSIS — Z13.820 SCREENING FOR OSTEOPOROSIS: ICD-10-CM

## 2021-10-08 DIAGNOSIS — M65.4 TENOSYNOVITIS, DE QUERVAIN: ICD-10-CM

## 2021-10-08 DIAGNOSIS — Z12.11 SCREEN FOR COLON CANCER: ICD-10-CM

## 2021-10-08 DIAGNOSIS — M17.0 PRIMARY OSTEOARTHRITIS OF BOTH KNEES: ICD-10-CM

## 2021-10-08 DIAGNOSIS — Z00.00 ENCOUNTER FOR MEDICARE ANNUAL WELLNESS EXAM: Primary | ICD-10-CM

## 2021-10-08 DIAGNOSIS — E66.01 MORBID OBESITY (H): ICD-10-CM

## 2021-10-08 DIAGNOSIS — E78.5 HYPERLIPIDEMIA LDL GOAL <100: ICD-10-CM

## 2021-10-08 DIAGNOSIS — I10 ESSENTIAL HYPERTENSION, BENIGN: ICD-10-CM

## 2021-10-08 DIAGNOSIS — Z23 NEED FOR TETANUS BOOSTER: ICD-10-CM

## 2021-10-08 DIAGNOSIS — Z12.31 VISIT FOR SCREENING MAMMOGRAM: ICD-10-CM

## 2021-10-08 LAB
ANION GAP SERPL CALCULATED.3IONS-SCNC: 9 MMOL/L (ref 5–18)
BUN SERPL-MCNC: 14 MG/DL (ref 8–22)
CALCIUM SERPL-MCNC: 9.3 MG/DL (ref 8.5–10.5)
CHLORIDE BLD-SCNC: 105 MMOL/L (ref 98–107)
CHOLEST SERPL-MCNC: 188 MG/DL
CO2 SERPL-SCNC: 23 MMOL/L (ref 22–31)
CREAT SERPL-MCNC: 0.74 MG/DL (ref 0.6–1.1)
FASTING STATUS PATIENT QL REPORTED: YES
GFR SERPL CREATININE-BSD FRML MDRD: 84 ML/MIN/1.73M2
GLUCOSE BLD-MCNC: 131 MG/DL (ref 70–125)
HDLC SERPL-MCNC: 70 MG/DL
LDLC SERPL CALC-MCNC: 102 MG/DL
POTASSIUM BLD-SCNC: 3.9 MMOL/L (ref 3.5–5)
SODIUM SERPL-SCNC: 137 MMOL/L (ref 136–145)
TRIGL SERPL-MCNC: 81 MG/DL

## 2021-10-08 PROCEDURE — 80048 BASIC METABOLIC PNL TOTAL CA: CPT | Performed by: FAMILY MEDICINE

## 2021-10-08 PROCEDURE — 99213 OFFICE O/P EST LOW 20 MIN: CPT | Mod: 25 | Performed by: FAMILY MEDICINE

## 2021-10-08 PROCEDURE — 99397 PER PM REEVAL EST PAT 65+ YR: CPT | Mod: 25 | Performed by: FAMILY MEDICINE

## 2021-10-08 PROCEDURE — 20610 DRAIN/INJ JOINT/BURSA W/O US: CPT | Performed by: FAMILY MEDICINE

## 2021-10-08 PROCEDURE — 80061 LIPID PANEL: CPT | Performed by: FAMILY MEDICINE

## 2021-10-08 PROCEDURE — 36415 COLL VENOUS BLD VENIPUNCTURE: CPT | Performed by: FAMILY MEDICINE

## 2021-10-08 PROCEDURE — 96127 BRIEF EMOTIONAL/BEHAV ASSMT: CPT | Mod: 59 | Performed by: FAMILY MEDICINE

## 2021-10-08 RX ORDER — TRIAMCINOLONE ACETONIDE 40 MG/ML
40 INJECTION, SUSPENSION INTRA-ARTICULAR; INTRAMUSCULAR ONCE
Status: COMPLETED | OUTPATIENT
Start: 2021-10-08 | End: 2021-10-08

## 2021-10-08 RX ORDER — ATENOLOL AND CHLORTHALIDONE TABLET 50; 25 MG/1; MG/1
1 TABLET ORAL DAILY
Qty: 90 TABLET | Refills: 3 | Status: SHIPPED | OUTPATIENT
Start: 2021-10-08 | End: 2022-09-12

## 2021-10-08 RX ORDER — CHOLECALCIFEROL (VITAMIN D3) 50 MCG
1 TABLET ORAL DAILY
Qty: 90 TABLET | Refills: 1 | Status: SHIPPED | OUTPATIENT
Start: 2021-10-08 | End: 2023-10-09

## 2021-10-08 RX ADMIN — TRIAMCINOLONE ACETONIDE 40 MG: 40 INJECTION, SUSPENSION INTRA-ARTICULAR; INTRAMUSCULAR at 12:37

## 2021-10-08 RX ADMIN — TRIAMCINOLONE ACETONIDE 40 MG: 40 INJECTION, SUSPENSION INTRA-ARTICULAR; INTRAMUSCULAR at 12:34

## 2021-10-08 ASSESSMENT — ACTIVITIES OF DAILY LIVING (ADL): CURRENT_FUNCTION: NO ASSISTANCE NEEDED

## 2021-10-08 ASSESSMENT — ANXIETY QUESTIONNAIRES
GAD7 TOTAL SCORE: 0
4. TROUBLE RELAXING: NOT AT ALL
IF YOU CHECKED OFF ANY PROBLEMS ON THIS QUESTIONNAIRE, HOW DIFFICULT HAVE THESE PROBLEMS MADE IT FOR YOU TO DO YOUR WORK, TAKE CARE OF THINGS AT HOME, OR GET ALONG WITH OTHER PEOPLE: NOT DIFFICULT AT ALL
5. BEING SO RESTLESS THAT IT IS HARD TO SIT STILL: NOT AT ALL
3. WORRYING TOO MUCH ABOUT DIFFERENT THINGS: NOT AT ALL
2. NOT BEING ABLE TO STOP OR CONTROL WORRYING: NOT AT ALL
1. FEELING NERVOUS, ANXIOUS, OR ON EDGE: NOT AT ALL
7. FEELING AFRAID AS IF SOMETHING AWFUL MIGHT HAPPEN: NOT AT ALL
6. BECOMING EASILY ANNOYED OR IRRITABLE: NOT AT ALL

## 2021-10-08 ASSESSMENT — PATIENT HEALTH QUESTIONNAIRE - PHQ9: SUM OF ALL RESPONSES TO PHQ QUESTIONS 1-9: 1

## 2021-10-08 ASSESSMENT — MIFFLIN-ST. JEOR: SCORE: 1304.13

## 2021-10-08 NOTE — PROGRESS NOTES
"SUBJECTIVE:   Konrad Mcarthur is a 66 year old female who presents for Preventive Visit. Patient new to our practice and her visit done with the help of phone       Patient has been advised of split billing requirements and indicates understanding: Yes   Are you in the first 12 months of your Medicare coverage?  No    Healthy Habits:     In general, how would you rate your overall health?  Fair    Frequency of exercise:  6-7 days/week    Duration of exercise:  15-30 minutes    Do you usually eat at least 4 servings of fruit and vegetables a day, include whole grains    & fiber and avoid regularly eating high fat or \"junk\" foods?  Yes    Taking medications regularly:  Yes    Medication side effects:  None    Ability to successfully perform activities of daily living:  No assistance needed    Home Safety:  No safety concerns identified    Hearing Impairment:  No hearing concerns    In the past 6 months, have you been bothered by leaking of urine?  No    In general, how would you rate your overall mental or emotional health?  Good      PHQ-2 Total Score: 0    Additional concerns today:  No    Do you feel safe in your environment? Yes    Have you ever done Advance Care Planning? (For example, a Health Directive, POLST, or a discussion with a medical provider or your loved ones about your wishes): No, advance care planning information given to patient to review.  Patient plans to discuss their wishes with loved ones or provider.         Fall risk  Fallen 2 or more times in the past year?: No  Any fall with injury in the past year?: No    Cognitive Screening   1) Repeat 3 items (Leader, Season, Table)    2) Clock draw: Unable to read & write  3) 3 item recall: Recalls 3 objects  Results: 3 items recalled      Reviewed and updated as needed this visit by clinical staff  Tobacco  Allergies  Meds  Problems  Med Hx  Surg Hx  Fam Hx          Reviewed and updated as needed this visit by Provider  Tobacco  " Allergies  Meds  Problems  Med Hx  Surg Hx  Fam Hx         Social History     Tobacco Use     Smoking status: Never Smoker     Smokeless tobacco: Never Used   Substance Use Topics     Alcohol use: Not on file         Alcohol Use 10/8/2021   Prescreen: >3 drinks/day or >7 drinks/week? Not Applicable           Hypertension Follow-up  Not taking her med for 6 month as she feel its doesn't work, was taking lisinopril 20 mg       Do you check your blood pressure regularly outside of the clinic? No     Are you following a low salt diet? No    Are your blood pressures ever more than 140 on the top number (systolic) OR more   than 90 on the bottom number (diastolic), for example 140/90? Yes    bilateral osteoarthritis of knee: Patient reports she had taken cortisone shot x2 so far last one lasted for almost 8 months like to get the cortisone shot again  Patient describes her pain around the knee joint very difficult to walk, rates her pain around 8/10 most of the time does take Naprosyn as needed has not tried any physical therapy or stretching or exercises and not interested in it    Current providers sharing in care for this patient include:   Patient Care Team:  Seth Mcmahan MD as PCP - General  Seth Mcmahan MD as Assigned PCP    The following health maintenance items are reviewed in Epic and correct as of today:  Health Maintenance Due   Topic Date Due     DEXA  Never done     COLORECTAL CANCER SCREENING  Never done     ZOSTER IMMUNIZATION (1 of 2) Never done     DTAP/TDAP/TD IMMUNIZATION (2 - Td or Tdap) 11/19/2019     Pneumococcal Vaccine: 65+ Years (2 of 2 - PPSV23) 03/04/2021     Lab work is in process  Labs reviewed in EPIC  BP Readings from Last 3 Encounters:   10/08/21 (!) 180/110   03/25/20 (!) 159/99   03/04/20 (!) 161/87    Wt Readings from Last 3 Encounters:   10/08/21 92.3 kg (203 lb 6.4 oz)   03/25/20 86 kg (189 lb 9.6 oz)   03/04/20 83.6 kg (184 lb 6.4 oz)                  Patient Active  "Problem List   Diagnosis     Essential hypertension, benign     H/O: hysterectomy     Primary osteoarthritis of both knees     Morbid obesity (H)     Past Surgical History:   Procedure Laterality Date     HYSTERECTOMY      [place], around 2000     HYSTERECTOMY         Social History     Tobacco Use     Smoking status: Never Smoker     Smokeless tobacco: Never Used   Substance Use Topics     Alcohol use: Not on file     Family History   Problem Relation Age of Onset     Diabetes Sister          Current Outpatient Medications   Medication Sig Dispense Refill     atenolol-chlorthalidone (TENORETIC) 50-25 MG tablet Take 1 tablet by mouth daily 90 tablet 3     naproxen (NAPROSYN) 375 MG tablet Take 1 tablet (375 mg) by mouth 2 times daily (with meals) 60 tablet 0     vitamin D3 (CHOLECALCIFEROL) 50 mcg (2000 units) tablet Take 1 tablet (50 mcg) by mouth daily 90 tablet 1     atorvastatin 10 MG PO tablet Take 1 tablet (10 mg) by mouth daily (Patient not taking: Reported on 10/8/2021) 90 tablet 1     Pneumonia Vaccine:Adults age 65+ who received Pneumovax (PPSV23) at 65 years or older: Should be given PCV13 > 1 year after their most recent PPSV23  Mammogram Screening: Mammogram Screening: Recommended mammography every 1-2 years with patient discussion and risk factor consideration  Any new diagnosis of family breast, ovarian, or bowel cancer? No    FHS-7: No flowsheet data found.Review of Systems  Constitutional, HEENT, cardiovascular, pulmonary, GI, , musculoskeletal both knee pain, neuro, skin, endocrine and psych systems are negative, except as otherwise noted.    OBJECTIVE:   BP (!) 180/110 (BP Location: Left arm, Patient Position: Sitting, Cuff Size: Adult Large)   Pulse 72   Ht 1.412 m (4' 7.59\")   Wt 92.3 kg (203 lb 6.4 oz)   Breastfeeding No   BMI 46.27 kg/m   Estimated body mass index is 46.27 kg/m  as calculated from the following:    Height as of this encounter: 1.412 m (4' 7.59\").    Weight as of this " encounter: 92.3 kg (203 lb 6.4 oz).  Physical Exam  GENERAL: healthy, alert and no distress  EYES: Eyes grossly normal to inspection, PERRL and conjunctivae and sclerae normal  HENT: ear canals and TM's normal, nose and mouth without ulcers or lesions  NECK: no adenopathy, no asymmetry, masses, or scars and thyroid normal to palpation  RESP: lungs clear to auscultation - no rales, rhonchi or wheezes  BREAST: normal without masses, tenderness or nipple discharge and no palpable axillary masses or adenopathy  CV: regular rate and rhythm, normal S1 S2, no S3 or S4, no murmur, click or rub, no peripheral edema and peripheral pulses strong  ABDOMEN: soft, nontender, no hepatosplenomegaly, no masses and bowel sounds normal  MS: no gross musculoskeletal defects noted, + swelling around the knee   range of motion intact  SKIN: no suspicious lesions or rashes  NEURO: Normal strength and tone, mentation intact and speech normal  PSYCH: mentation appears normal, affect normal/bright    Diagnostic Test Results:  Labs reviewed in Epic    ASSESSMENT / PLAN:   Konrad was seen today for wellness visit, med mgmt. and establish care.  Patient interview was done with the help of her phone  patient does have her granddaughter in law present during the clinic visit who she lives with    Diagnoses and all orders for this visit:    Encounter for Medicare annual wellness exam  Comments:  lives with grandson and his wife and there one daughter for last 2 yr     Essential hypertension, benign  Comments:  no med for 5-6 month, BP quite elevated, no chest pain pressure, change her med today, adv f/u 2-3 wk  Orders:  -     Basic metabolic panel; Future  -     atenolol-chlorthalidone (TENORETIC) 50-25 MG tablet; Take 1 tablet by mouth daily  -     Basic metabolic panel    Primary osteoarthritis of both knees  Symptomatic treatment with Tylenol Naprosyn as needed gentle exercises on daily basis  Procedure Details     Verbal consent was  obtained for the procedure. The both knee  joint was prepped with Betadine. A 22 gauge needle was inserted into the Inferior lateral  aspect of the both knee  joint with  2 ml 1% lidocaine+2cc of 0.5% marcaine  and 1 ml of triamcinolone (KENALOG) 40mg/ml( total 5ml )  The needle was removed and the area  dressed with bandage     Complications:  None; patient tolerated the procedure well.  -     triamcinolone (KENALOG-40) injection 40 mg  -     triamcinolone (KENALOG-40) injection 40 mg  -     vitamin D3 (CHOLECALCIFEROL) 50 mcg (2000 units) tablet; Take 1 tablet (50 mcg) by mouth daily  -     DRAIN/INJECT LARGE JOINT/BURSA    Morbid obesity (H)  Adv on walking daily and limit potion size     Screening for osteoporosis  -     DEXA HIP/PELVIS/SPINE - Future; Future    Visit for screening mammogram  Comments:  not ready to do any screening   Orders:  -     MA SCREENING DIGITAL BILAT - Future  (s+30); Future    Screen for colon cancer  -Lengthy discussion with patient with the help of  that how important to do the colon cancer screening patient will try it again       VALENTIN([employer] SCI[employer])    Hyperlipidemia LDL goal <100  -     Lipid Profile; Future  -     Lipid Profile    Tenosynovitis, de Quervain  -     naproxen (NAPROSYN) 375 MG tablet; Take 1 tablet (375 mg) by mouth 2 times daily (with meals)    Need for tetanus booster  -     TD PRESERV FREE, IM (7+ YRS) (DECAVAC/TENIVAC); Future    Other orders  -     REVIEW OF HEALTH MAINTENANCE PROTOCOL ORDERS  -     PNEUMOCOC CONJ VAC 13 PHYLLIS (MNVAC) ([id number])        Patient has been advised of split billing requirements and indicates understanding: Yes  COUNSELING:  Reviewed preventive health counseling, as reflected in patient instructions       Regular exercise       Healthy diet/nutrition       Vision screening       Hearing screening       Dental care       Bladder control       Fall risk prevention       Osteoporosis prevention/bone health       Colon  "cancer screening       Advanced Planning     Estimated body mass index is 46.27 kg/m  as calculated from the following:    Height as of this encounter: 1.412 m (4' 7.59\").    Weight as of this encounter: 92.3 kg (203 lb 6.4 oz).    Weight management plan: Discussed healthy diet and exercise guidelines    She reports that she has never smoked. She has never used smokeless tobacco.      Appropriate preventive services were discussed with this patient, including applicable screening as appropriate for cardiovascular disease, diabetes, osteopenia/osteoporosis, and glaucoma.  As appropriate for age/gender, discussed screening for colorectal cancer, prostate cancer, breast cancer, and cervical cancer. Checklist reviewing preventive services available has been given to the patient.    Reviewed patients plan of care and provided an AVS. The Basic Care Plan (routine screening as documented in Health Maintenance) for Konrad meets the Care Plan requirement. This Care Plan has been established and reviewed with the Patient and caregiver.    Counseling Resources:  ATP IV Guidelines  Pooled Cohorts Equation Calculator  Breast Cancer Risk Calculator  Breast Cancer: Medication to Reduce Risk  FRAX Risk Assessment  ICSI Preventive Guidelines  Dietary Guidelines for Americans, 2010  USDA's MyPlate  ASA Prophylaxis  Lung CA Screening    Taylor Wilkins MD  [facility]    Identified Health Risks:  "

## 2021-10-08 NOTE — PATIENT INSTRUCTIONS
We changed your blood pressure medication to better control it   Doing some blood work  Order the mammogram and colon cancer screening kit which will come to your home   Added vitamin D to be taken as needed     Patient Education   Personalized Prevention Plan  You are due for the preventive services outlined below.  Your care team is available to assist you in scheduling these services.  If you have already completed any of these items, please share that information with your care team to update in your medical record.  Health Maintenance Due   Topic Date Due     Osteoporosis Screening  Never done     ANNUAL REVIEW OF HM ORDERS  Never done     Discuss Advance Care Planning  Never done     Colorectal Cancer Screening  Never done     Zoster (Shingles) Vaccine (1 of 2) Never done     Mammogram  06/01/2018     FALL RISK ASSESSMENT  09/13/2019     Diptheria Tetanus Pertussis (DTAP/TDAP/TD) Vaccine (2 - Td or Tdap) 11/19/2019     Depression Assessment  08/19/2020     Pneumococcal Vaccine (2 of 2 - PPSV23) 03/04/2021        Patient Education     Caesar Tswj Ntshav Siab  Ntshav siab (ntshav julio siab) feem ntau raug hu ua tus kab mob tua neeg ntsiag to. Hu li no vim tias muaj ntau tus neeg uas muaj tus mob no yeej tsis paub txog nws. Ntshav siab yog muaj txog 140/90 mm Hg lossis siab olga lidia. Yuav paub tias koj muaj ntshav siab yuav tsum nquag kuaj xyuas. Caesar pab txo thiaj tuaj yeem pab tau koj txoj david. Nod yog qee yam uas tuaj yeem pab koj tswj tau koj li ntshav siab.     Xaiv cov khoom noj zoo rere lub plawv    Xaiv cov khoom noj tsuag, muaj roj tsawg. Koj cov khoom noj muaj sodium li 2,400 mg neto hnub xwb lossis tsuas noj raws li koj tus kws kuaj mob tau hais qhia xwb.    Txo tsis txhob noj cov khoom noj ntim hauv kaus poom, khoom noj qhuav, khoom no tsaus ntev, [facility] noj ntim, thiab [facility] noj ua ceev. Cov [facility] no no muaj ntsev ntau.    Noj txiv hmab txiv ntoo thiab zaub 8 txog 10 zaus hauv txhua hnub.    Xaiv noj nqaij  ntshiv, nqaij ntses, lossis nqaij qaib.    Noj cov tseem noob pasta, mov daj lis, thiab taum.    Haus cov mis uas muaj roj tsawg lossis tsis muaj roj 2 txog 3 zaug.    Nug koj tus kws khomob txog qhov rosa m npaj noj raws li qhov rosa m qhia ntawm DASH. Qhov rosa m npaj no yuav pab txo tau ntshav siab.    Thaum koj mus nelli ib lub tsev noj mov, hais kom lawv txhob rere ntsev rere koj cov zaub mov noj.    Tswj xyuas qhov hnyav ntawm lub cev kom nyob rere kis zoo    Nug koj tus kws kuaj mob zoie ib hnub twg yuav noj cov khoom noj muaj calories ntau npaum li gaby. Rachel yuav tsum noj raws li qhia ntawd.    Nug koj tus kws kuaj mob zoie qhov hnyav ntawm lub cev uas zoo tshaj plaws rere koj yog li gaby. Yog koj lub cev hnyav heev, rosa m txo qhov hnyav ntawm koj lub cev kom poob qis li 3% txog 5% yeej pab txo tau cov ntshav siab. Lub philip phiaj txo qhov hnyav kom poob qis txog 10% rere hauv ib lub xyoos yog ib lub philip phiaj zoo.    Txo tsis txhob noj cov khoom noj txom ncauj thiab cov khoom noj qab zib.    Nquag ua ev xaws xais.    Sawv mus los thiab nquag ua haujlwm    Xaiv ua cov haujlwm koj nyiam. Nrhiav jose carlos yam uas koj tuaj yeem ua nrog cov phooj ywg lossis tsev neeg. Jose Carlos no muaj xws li caij tsheb kauj vab, mari parul taw, taug rosa m, thiab khiav lauri lynn.    Nres tsheb kom con me ntsis ntawm lub qhov rooj nkag mus rere hauv lub tsev.    Tsis txhob siv ntaiv hluav taws xob siv ntaiv nce.    Thaum koj tuaj yeem ua tau rachel taug rosa m lossis caij tsheb kauj vab hloov rere qhov caij tsheb.    Txhob haus dej cawv, ua jc zaub, lossis marilia jc shantell ub no.    Ib asthiv twg yuav tsum tau ua haujlwm sib mus txog haujlwm siv zog tsawg kawg yog 40 feeb hauv 3 ntawm 4 hnub.     Tswj xyuas kom txhob ntxhov siab    Nrhiav sijhawm so thiab ua rosa m lom zem. Nrhiav sijhawm luag.    Qhia yam koj txhawj xeeb rere koj tus hlub thiab koj tus kws kuaj mob.    Mus ntsib tsev neeg thiab cov phooj ywg thiab kav tsij ua cov haujlwm uas koj nquag ua tas li ntawd.    Txo txhob haus  cawv ntau thiab txiav luam yeeb    Cov txiv neej yuav tsum tsis haus cawv tshaj 2 khob hauv ib hnub.    Cov poj niam yuav tsum tsis haus cawv tshaj 1 khob hauv ib hnub.    Marciano nrog koj tus kws kuaj mob txog rosa m txiav luam yeeb. Rosa M haus luam yeeb yuav muaj feem ua rere koj muaj kab mob plawv thiab mob hlab ntshav tawg. Nug koj tus kws kuaj mob txog cov rosa m pab txiav luam yeeb nyob hauv lub zos thiab lwm yam rosa m xaiv.    Cov Tshuaj Khomob  Yog rosa m pauv hloov hauv yus lub neej tsis zoo txaus, jose carlos zaum koj tus kws kuaj mob yuav sukh ntawv yuav tshuaj zoo ntshav siab rere koj. Noj txhua yam tshuaj raws li hais. Yog koj muaj eliazar nug txog koj cov tshuaj, nug koj tus kws kuaj mob ua ntej nathan tseg tsis noj lossis ua ntej yuav hloov.     [id number] [employer] Txwv tsis pub yuam tas nrho cov neftali kav. Cov ntsiab eliazar ntamw no tsis yog muab coj los ua ib yam hloov chaw rere txoj rosa m marilia mob nkeeg. Yuav tsum ua raws li koj tug kws marilia mob cov eliazar taw qhia tas mus li.

## 2022-08-23 DIAGNOSIS — I10 ESSENTIAL HYPERTENSION, BENIGN: ICD-10-CM

## 2022-08-23 NOTE — TELEPHONE ENCOUNTER
Medication Question or Refill        What medication are you calling about (include dose and sig)?: atenolol-chlorthalidone (TENORETIC) 50-25 MG tablet    Controlled Substance Agreement on file:   CSA -- Patient Level:    CSA: None found at the patient level.       Who prescribed the medication?: galdino    Do you need a refill? Yes:    When did you use the medication last? daily    Patient offered an appointment? Yes:    Do you have any questions or concerns?  No    Preferred Pharmacy:   [facility] pharmacy    [facility] [place] [address]    Okay to leave a detailed message?: Yes at Cell number on file:    Telephone Information:   Mobile [phone]

## 2022-08-25 NOTE — TELEPHONE ENCOUNTER
"Routing refill request to provider for review/approval because:  Blood pressure out of range    Last Written Prescription Date:  10/8/21  Last Fill Quantity: 90,  # refills: 3  Last office visit provider:  10/8/21    Requested Prescriptions   Pending Prescriptions Disp Refills     atenolol-chlorthalidone (TENORETIC) 50-25 MG tablet 90 tablet 3     Sig: Take 1 tablet by mouth daily       Beta-Blockers Protocol Failed - 8/23/2022  1:05 PM        Failed - Blood pressure under 140/90 in past 12 months     BP Readings from Last 3 Encounters:   10/08/21 (!) 160/100   03/25/20 (!) 159/99   03/04/20 (!) 161/87                 Passed - Patient is age 6 or older        Passed - Recent (12 mo) or future (30 days) visit within the authorizing provider's specialty     Patient has had an office visit with the authorizing provider or a provider within the authorizing providers department within the previous 12 mos or has a future within next 30 days. See \"Patient Info\" tab in inbasket, or \"Choose Columns\" in Meds & Orders section of the refill encounter.              Passed - Medication is active on med list       Diuretics (Including Combos) Protocol Failed - 8/23/2022  1:05 PM        Failed - Blood pressure under 140/90 in past 12 months     BP Readings from Last 3 Encounters:   10/08/21 (!) 160/100   03/25/20 (!) 159/99   03/04/20 (!) 161/87                 Passed - Recent (12 mo) or future (30 days) visit within the authorizing provider's specialty     Patient has had an office visit with the authorizing provider or a provider within the authorizing providers department within the previous 12 mos or has a future within next 30 days. See \"Patient Info\" tab in inbasket, or \"Choose Columns\" in Meds & Orders section of the refill encounter.              Passed - Medication is active on med list        Passed - Patient is age 18 or older        Passed - No active pregancy on record        Passed - Normal serum creatinine on file in " past 12 months     Recent Labs   Lab Test 10/08/21  1027   CR 0.74              Passed - Normal serum potassium on file in past 12 months     Recent Labs   Lab Test 10/08/21  1027   POTASSIUM 3.9                    Passed - Normal serum sodium on file in past 12 months     Recent Labs   Lab Test 10/08/21  1027                 Passed - No positive pregnancy test in past 12 months             Digna Renteria RN 08/24/22 11:26 PM

## 2022-08-30 RX ORDER — ATENOLOL AND CHLORTHALIDONE TABLET 50; 25 MG/1; MG/1
1 TABLET ORAL DAILY
Qty: 90 TABLET | Refills: 3 | OUTPATIENT
Start: 2022-08-30

## 2022-08-30 NOTE — TELEPHONE ENCOUNTER
Denying this request because patient appears to have established care at the [facility].

## 2022-09-12 ENCOUNTER — OFFICE VISIT (OUTPATIENT)
Dept: FAMILY MEDICINE | Facility: CLINIC | Age: 67
End: 2022-09-12
Payer: COMMERCIAL

## 2022-09-12 VITALS
HEART RATE: 56 BPM | BODY MASS INDEX: 46.71 KG/M2 | WEIGHT: 205.3 LBS | DIASTOLIC BLOOD PRESSURE: 100 MMHG | SYSTOLIC BLOOD PRESSURE: 170 MMHG

## 2022-09-12 DIAGNOSIS — E66.01 MORBID OBESITY (H): ICD-10-CM

## 2022-09-12 DIAGNOSIS — Z91.199 NON-COMPLIANCE WITH TREATMENT: ICD-10-CM

## 2022-09-12 DIAGNOSIS — M17.0 PRIMARY OSTEOARTHRITIS OF BOTH KNEES: ICD-10-CM

## 2022-09-12 DIAGNOSIS — Z12.11 SCREEN FOR COLON CANCER: ICD-10-CM

## 2022-09-12 DIAGNOSIS — E78.5 HYPERLIPIDEMIA LDL GOAL <130: ICD-10-CM

## 2022-09-12 DIAGNOSIS — I10 ESSENTIAL HYPERTENSION, BENIGN: ICD-10-CM

## 2022-09-12 DIAGNOSIS — Z23 NEED FOR PNEUMOCOCCAL VACCINATION: ICD-10-CM

## 2022-09-12 DIAGNOSIS — I10 ESSENTIAL HYPERTENSION, BENIGN: Primary | ICD-10-CM

## 2022-09-12 LAB
ALBUMIN SERPL BCG-MCNC: 4.2 G/DL (ref 3.5–5.2)
ALP SERPL-CCNC: 77 U/L (ref 35–104)
ALT SERPL W P-5'-P-CCNC: 64 U/L (ref 10–35)
ANION GAP SERPL CALCULATED.3IONS-SCNC: 10 MMOL/L (ref 7–15)
AST SERPL W P-5'-P-CCNC: 55 U/L (ref 10–35)
BILIRUB SERPL-MCNC: 0.6 MG/DL
BUN SERPL-MCNC: 19 MG/DL (ref 8–23)
CALCIUM SERPL-MCNC: 9.5 MG/DL (ref 8.8–10.2)
CHLORIDE SERPL-SCNC: 105 MMOL/L (ref 98–107)
CHOLEST SERPL-MCNC: 186 MG/DL
CREAT SERPL-MCNC: 0.71 MG/DL (ref 0.51–0.95)
DEPRECATED HCO3 PLAS-SCNC: 25 MMOL/L (ref 22–29)
GFR SERPL CREATININE-BSD FRML MDRD: >90 ML/MIN/1.73M2
GLUCOSE SERPL-MCNC: 98 MG/DL (ref 70–99)
HDLC SERPL-MCNC: 80 MG/DL
LDLC SERPL CALC-MCNC: 86 MG/DL
NONHDLC SERPL-MCNC: 106 MG/DL
POTASSIUM SERPL-SCNC: 3.8 MMOL/L (ref 3.4–5.3)
PROT SERPL-MCNC: 6.9 G/DL (ref 6.4–8.3)
SODIUM SERPL-SCNC: 140 MMOL/L (ref 136–145)
TRIGL SERPL-MCNC: 102 MG/DL

## 2022-09-12 PROCEDURE — 90677 PCV20 VACCINE IM: CPT | Performed by: FAMILY MEDICINE

## 2022-09-12 PROCEDURE — 36415 COLL VENOUS BLD VENIPUNCTURE: CPT | Performed by: FAMILY MEDICINE

## 2022-09-12 PROCEDURE — 80053 COMPREHEN METABOLIC PANEL: CPT | Performed by: FAMILY MEDICINE

## 2022-09-12 PROCEDURE — 80061 LIPID PANEL: CPT | Performed by: FAMILY MEDICINE

## 2022-09-12 PROCEDURE — 99215 OFFICE O/P EST HI 40 MIN: CPT | Mod: 25 | Performed by: FAMILY MEDICINE

## 2022-09-12 PROCEDURE — G0009 ADMIN PNEUMOCOCCAL VACCINE: HCPCS | Performed by: FAMILY MEDICINE

## 2022-09-12 RX ORDER — ATORVASTATIN CALCIUM 10 MG/1
10 TABLET, FILM COATED ORAL DAILY
Qty: 90 TABLET | Refills: 4 | Status: SHIPPED | OUTPATIENT
Start: 2022-09-12 | End: 2023-10-09

## 2022-09-12 RX ORDER — ATENOLOL AND CHLORTHALIDONE TABLET 100; 25 MG/1; MG/1
1 TABLET ORAL DAILY
Qty: 90 TABLET | Refills: 4 | Status: SHIPPED | OUTPATIENT
Start: 2022-09-12 | End: 2023-10-09

## 2022-09-12 NOTE — PROGRESS NOTES
Assessment & Plan     Patient presents with:  Recheck Medication       Konrad was seen today for recheck medication.  Interview done with the help of     Diagnoses and all orders for this visit:    Essential hypertension, benign  Comments:  ran out of her BP medicine for last 2 month , advised to restart the medication as soon as she gets it.  Maybe change the pharmacy if the mail order pharmacy option is not available  Orders:  -     REVIEW OF HEALTH MAINTENANCE PROTOCOL ORDERS  -     Comprehensive metabolic panel (BMP + Alb, Alk Phos, ALT, AST, Total. Bili, TP); Future  -     atenolol-chlorthalidone (TENORETIC) 100-25 MG tablet; Take 1 tablet by mouth daily  -     atorvastatin (LIPITOR) 10 MG tablet; Take 1 tablet (10 mg) by mouth daily  -     Primary Care - Care Coordination Referral; Future  -     Comprehensive metabolic panel (BMP + Alb, Alk Phos, ALT, AST, Total. Bili, TP)    Screen for colon cancer  -     Cancel: COLOGUARD([employer] SCIENCES)    Morbid obesity (H)    Need for pneumococcal vaccination  Vaccination done today  Primary osteoarthritis of both knees  Advised patient if possible reconnect with orthopedic specialist for better manage her chronic knee pain  -     Orthopedic  Referral; Future    Essential hypertension, benign  -     REVIEW OF HEALTH MAINTENANCE PROTOCOL ORDERS  -     Comprehensive metabolic panel (BMP + Alb, Alk Phos, ALT, AST, Total. Bili, TP); Future  -     atenolol-chlorthalidone (TENORETIC) 100-25 MG tablet; Take 1 tablet by mouth daily  -     atorvastatin (LIPITOR) 10 MG tablet; Take 1 tablet (10 mg) by mouth daily  -     Primary Care - Care Coordination Referral; Future  -     Comprehensive metabolic panel (BMP + Alb, Alk Phos, ALT, AST, Total. Bili, TP)    Hyperlipidemia LDL goal <130  -     Lipid Profile (Chol, Trig, HDL, LDL calc); Future  -     Lipid Profile (Chol, Trig, HDL, LDL calc)    Non-compliance with treatment  Comments:  As doesn't have  "transportation and depend on her son and his family for meds and mauro  Orders:  -     Primary Care - Care Coordination Referral; Future    Other orders  -     ZOSTER VACCINE RECOMBINANT ADJUVANTED (SHINGRIX); Future  -     Pneumococcal 20 Valent Conjugate (PCV20); Future  -     Pneumococcal 20 Valent Conjugate (PCV20)            BMI:   Estimated body mass index is 46.71 kg/m  as calculated from the following:    Height as of 10/8/21: 1.412 m (4' 7.59\").    Weight as of this encounter: 93.1 kg (205 lb 4.8 oz).   Weight management plan: Discussed healthy diet and exercise guidelines        Return in about 3 months (around 12/12/2022) for Follow up.      Subjective   Konrad Mcarthur 67 year old who presents for the following health issues   HPI     Hypertension Follow-up      Do you check your blood pressure regularly outside of the clinic? No     Are you following a low salt diet? No    Are your blood pressures ever more than 140 on the top number (systolic) OR more   than 90 on the bottom number (diastolic), for example 140/90? Yes      How many servings of fruits and vegetables do you eat daily?  0-1    On average, how many sweetened beverages do you drink each day (Examples: soda, juice, sweet tea, etc.  Do NOT count diet or artificially sweetened beverages)?   1    How many days per week do you exercise enough to make your heart beat faster? 3 or less    How many minutes a day do you exercise enough to make your heart beat faster? 9 or less  How many days per week do you miss taking your medication? 2  What makes it hard for you to take your medications?  remembering to take  And refills     Chronic bilateral knee pain from chronic arthritis: Had seen orthopedics in the past with a cortisone shot then I also repeated cortisone injection for her 11 months ago which worked only for couple months and now pain is back.  Patient has no way to transportation to see a specialist again so our choices are quite limited for her " treatment except continue Naprosyn as needed    Patient Active Problem List   Diagnosis     Essential hypertension, benign     H/O: hysterectomy     Primary osteoarthritis of both knees     Morbid obesity (H)     Non-compliance with treatment        Current Outpatient Medications   Medication     atenolol-chlorthalidone (TENORETIC) 100-25 MG tablet     atorvastatin (LIPITOR) 10 MG tablet     naproxen (NAPROSYN) 375 MG tablet     vitamin D3 (CHOLECALCIFEROL) 50 mcg (2000 units) tablet     No current facility-administered medications for this visit.          Social Determinants of Health     Tobacco Use: Low Risk      Smoking Tobacco Use: Never Smoker     Smokeless Tobacco Use: Never Used   Alcohol Use: Not on file   Financial Resource Strain: Not on file   Food Insecurity: Not on file   Transportation Needs: Not on file   Physical Activity: Not on file   Stress: Not on file   Social Connections: Not on file   Intimate Partner Violence: Not on file   Depression: Not at risk     PHQ-2 Score: 2   Housing Stability: Not on file        Review of Systems   Constitutional, HEENT, cardiovascular, pulmonary, GI, , musculoskeletal, neuro, skin, endocrine and psych systems are negative, except as otherwise noted.      Objective    BP (!) 170/100   Pulse 56   Wt 93.1 kg (205 lb 4.8 oz)   Breastfeeding No   BMI 46.71 kg/m     LMP 06/01/2011   There is no height or weight on file to calculate BMI.  Physical Exam   GENERAL: healthy, alert and no distress  NECK: no adenopathy, no asymmetry, masses, or scars and thyroid normal to palpation  RESP: lungs clear to auscultation - no rales, rhonchi or wheezes  CV: regular rate and rhythm, normal S1 S2, no S3 or S4, no murmur, click or rub, no peripheral edema and peripheral pulses strong  ABDOMEN: soft, nontender, no hepatosplenomegaly, no masses and bowel sounds normal  MS: no gross musculoskeletal defects noted, no edema. Slow limping gait + joint line tenderness     I spent 40  minutes with the patient, >50% of which was in counseling regarding the patient's medical issues as noted above.  Taylor Wilkins MD   [facility].  [phone]

## 2022-09-12 NOTE — PATIENT INSTRUCTIONS
We  will refill you blood pressure medication  please start taking it today  Doing some blood work  Order the mammogram and colon cancer screening kit which will come to your home   Added vitamin D to be taken as needed   Continue cholesterol med   Referral to Orthopedic to discuss treatment options for your knee pain   Follow up 3 month     Taylor Wilkins MD   Patient Education   Personalized Prevention Plan  You are due for the preventive services outlined below.  Your care team is available to assist you in scheduling these services.  If you have already completed any of these items, please share that information with your care team to update in your medical record.  Health Maintenance Due   Topic Date Due    Osteoporosis Screening  Never done    Colorectal Cancer Screening  Never done    Zoster (Shingles) Vaccine (1 of 2) Never done    Diptheria Tetanus Pertussis (DTAP/TDAP/TD) Vaccine (2 - Td or Tdap) 11/19/2019    Pneumococcal Vaccine (2 - PPSV23 or PCV20) 03/04/2021    COVID-19 Vaccine (3 - Booster for Moderna series) 11/02/2021    Annual Wellness Visit  10/08/2022    FALL RISK ASSESSMENT  10/08/2022        Patient Education     Caesar Tswj Ntshav Siab  Ntshav siab (ntshav julio siab) feem ntau raug hu ua tus kab mob tua neeg ntsiag to. Hu li no vim tias muaj ntau tus neeg uas muaj tus mob no yeej tsis paub txog nws. Ntshav siab yog muaj txog 140/90 mm Hg lossis siab olga lidia. Yuav paub tias koj muaj ntshav siab yuav tsum nquag kuaj xyuas. Caesar pab txo thiaj tuaj yeem pab tau koj txoj david. Nod yog qee yam uas tuaj yeem pab koj tswj tau koj li ntshav siab.     Xaiv cov khoom noj zoo rere lub plawv  Xaiv cov khoom noj tsuag, muaj roj tsawg. Koj cov khoom noj muaj sodium li 2,400 mg neto hnub xwb lossis tsuas noj raws li koj tus kws kuaj mob tau hais qhia xwb.  Txo tsis txhob noj cov [facility] no ntim hauv kaus poom, [facility] no qhuav, [facility] no tsaus ntev, [facility] no ntim, thiab [facility] no ua ceev. Cov [facility] no no muaj ntsev  ntau.  Noj txiv hmab txiv ntoo thiab zaub 8 txog 10 zaus hauv txhua hnub.  Xaiv noj nqaij ntshiv, nqaij ntses, lossis nqaij qaib.  Noj cov tseem noob pasta, mov daj lis, thiab taum.  Haus cov mis uas muaj roj tsawg lossis tsis muaj roj 2 txog 3 zaug.  Nug koj tus kws khomob txog qhov rosa m npaj noj raws li qhov rosa m qhia ntawm DASH. Qhov rosa m npaj no yuav pab txo tau ntshav siab.  Thaum koj mus nelli ib lub tsev noj mov, hais kom lawv txhob rere ntsev rere koj cov zaub mov noj.    Tswj xyuas qhov hnyav ntawm lub cev kom nyob rere kis zoo  Nug koj tus kws kuaj mob zoie ib hnub twg yuav noj cov khoom noj muaj calories ntau npaum li gaby. Rachel yuav tsum noj raws li qhia ntawd.  Nug koj tus kws kuaj mob zoie qhov hnyav ntawm lub cev uas zoo tshaj plaws rere koj yog li gaby. Yog koj lub cev hnyav heev, rosa m txo qhov hnyav ntawm koj lub cev kom poob qis li 3% txog 5% yeej pab txo tau cov ntshav siab. Lub philip phiaj txo qhov hnyav kom poob qis txog 10% rere hauv ib lub xyoos yog ib lub philip phiaj zoo.  Txo tsis txhob noj cov khoom noj txom ncauj thiab cov khoom noj qab zib.  Nquag ua ev xaws xais.    Sawv mus los thiab nquag ua haujlwm  Xaiv ua cov haujlwm koj nyiam. Nrhiav jose carlos yam uas koj tuaj yeem ua nrog cov phooj ywg lossis tsev neeg. Jose Carlos no muaj xws li caij tsheb kauj vab, mari parul taw, taug rosa m, thiab khiav lauri lynn.  Nres tsheb kom con me ntsis ntawm lub qhov rooj nkag mus rere hauv lub tsev.  Tsis txhob siv ntaiv hluav taws xob siv ntaiv nce.  Thaum koj tuaj yeem ua tau rachel taug rosa m lossis caij tsheb kauj vab hloov rere qhov caij tsheb.  Txhob haus dej cawv, ua jc zaub, lossis marilia jc shantell ub no.  Ib asthiv twg yuav tsum tau ua haujlwm sib mus txog haujlwm siv zog tsawg kawg yog 40 feeb hauv 3 ntawm 4 hnub.     Tswj xyuas kom txhob ntxhov siab  Nrhiav sijhawm so thiab ua rosa m lom zem. Nrhiav sijhawm luag.  Qhia yam koj txhawj xeeb rere koj tus hlub thiab koj tus kws kuaj mob.  Mus ntsib tsev neeg thiab cov phooj ywg thiab kav tsij ua cov  haujlwm uas koj nquag ua tas li ntawd.    Txo txhob haus cawv ntau thiab txiav luam yeeb  Cov txiv neej yuav tsum tsis haus cawv tshaj 2 khob hauv ib hnub.  Cov poj niam yuav tsum tsis haus cawv tshaj 1 khob hauv ib hnub.  Marciano nrog koj tus kws kuaj mob txog rosa m txiav luam yeeb. Rosa M haus luam yeeb yuav muaj feem ua rere koj muaj kab mob plawv thiab mob hlab ntshav tawg. Nug koj tus kws kuaj mob txog cov rosa m pab txiav luam yeeb nyob hauv lub zos thiab lwm yam rosa m xaiv.    Cov Tshuaj Khomob  Yog rosa m pauv hloov hauv yus lub neej tsis zoo txaus, jose carlos zaum koj tus kws kuaj mob yuav sukh ntawv yuav tshuaj zoo ntshav siab rere koj. Noj txhua yam tshuaj raws li hais. Yog koj muaj eliazar nug txog koj cov tshuaj, nug koj tus kws kuaj mob ua ntej  tseg tsis noj lossis ua ntej yuav hloov.     [id number] [employer] Txwv tsis pub yuam tas nrho cov neftali kav. Cov ntsiab eliazar ntamw no tsis yog muab coj los ua ib yam hloov chaw rere txoj rosa m marilia mob nkeeg. Yuav tsum ua raws li koj tug kws marilia mob cov eliazar taw qhia tas mus li.

## 2022-09-13 ENCOUNTER — PATIENT OUTREACH (OUTPATIENT)
Dept: CARE COORDINATION | Facility: CLINIC | Age: 67
End: 2022-09-13

## 2022-09-13 NOTE — PROGRESS NOTES
Clinic Care Coordination Contact  [facility]/Voicemail    Clinical Data: Care Coordinator Outreach  Outreach attempted x 1.  Left message on Josef Kat, Granddaughter in law voicemail with call back information and requested return call.  Plan: Care Coordinator will try to reach patient again in 1-2 business days.    ** CHW spoke with patients Grandson Stephane and he asked for CHW to call his wife Josef because she takes care of medical for Konrad. - Consent to communicate on file but listed under General Consent in Media dated  9/12/22.    Laquita Rodríguez  Community Health Worker  [facility] Care Coordination   [facility]  Office: [phone]

## 2022-09-13 NOTE — LETTER
[facility] CARE COORDINATION  [address] [place] [address]    September 14, 2022    Konrad Mcarthur  [address] [place] [address]      Dear Konrad/Josef,    I am a clinic community health worker who works with Taylor Wilkins MD with the [facility]. I have been trying to reach you recently to introduce Clinic Care Coordination. Below is a description of clinic care coordination and how I can further assist you.       The clinic care coordination team is made up of a registered nurse, , financial resource worker and community health worker who understand the health care system. The goal of clinic care coordination is to help you manage your health and improve access to the health care system. Our team works alongside your provider to assist you in determining your health and social needs. We can help you obtain health care and community resources, providing you with necessary information and education. We can work with you through any barriers and develop a care plan that helps coordinate and strengthen the communication between you and your care team.    Please feel free to contact me with any questions or concerns regarding care coordination and what we can offer.      We are focused on providing you with the highest-quality healthcare experience possible.    Sincerely,     Laquita Rodríguez  Community Health Worker  [facility] Care Coordination   [address] [place] [facility]  Office: [phone]

## 2022-09-14 NOTE — PROGRESS NOTES
Clinic Care Coordination Contact  [facility]/Voicemail    Clinical Data: Care Coordinator Outreach  Outreach attempted x 2.  Left message on patients granddaughter in law Hly's voicemail with call back information and requested return call.  Plan: Care Coordinator will send care coordination introduction letter with care coordinator contact information and explanation of care coordination services via mail. Care Coordinator will do no further outreaches at this time.    Laquita Rodríguez  [facility] Health Worker  [facility] Care Coordination   [place] [place], [place] [facility]  Office: [phone]

## 2022-09-20 ENCOUNTER — PATIENT OUTREACH (OUTPATIENT)
Dept: CARE COORDINATION | Facility: CLINIC | Age: 67
End: 2022-09-20

## 2022-09-20 NOTE — PROGRESS NOTES
Clinic Care Coordination Contact  Community Health Worker Initial Outreach    Patient accepts CC: No, patients granddaughter Hlmichelle will talk with Konrad and Konrad's grandson Stephane and will call CHW back if interested. Patient was sent Care Coordination introduction letter for future reference on 9/14/22.     Laquita Rodríguez  [facility] Health Worker  [facility] Care Coordination   [place], [place] [facility]  Office: [phone]

## 2022-10-08 ENCOUNTER — MEDICAL CORRESPONDENCE (OUTPATIENT)
Dept: HEALTH INFORMATION MANAGEMENT | Facility: CLINIC | Age: 67
End: 2022-10-08

## 2023-05-17 ENCOUNTER — TELEPHONE (OUTPATIENT)
Dept: FAMILY MEDICINE | Facility: CLINIC | Age: 68
End: 2023-05-17
Payer: COMMERCIAL

## 2023-05-17 NOTE — TELEPHONE ENCOUNTER
"05/17/23    Medication Question or Refill        What medication are you calling about (include dose and sig)?: \"blood pressure medicine\"    Preferred Pharmacy:  [address]  [address] [address]  Phone: [phone] Fax: [phone]      Controlled Substance Agreement on file:   CSA -- Patient Level:    CSA: None found at the patient level.       Who prescribed the medication?: unsure    Do you need a refill? Yes    When did you use the medication last? Unsure    Patient offered an appointment? No    Do you have any questions or concerns?  Yes: Pt's grandson's wife, Josef Kat, calling to refill med. She states it is for blood pressure but does not know the name of it. She states that pt is low and wants to know if she is due for an appt before getting it refilled.      Okay to leave a detailed message?: Yes at [facility]: [phone]    "

## 2023-05-17 NOTE — TELEPHONE ENCOUNTER
Called and advised granddaughter that patient should have 1 refill on atenolol-chlorthalidone 100-25 mg left.  Patient should schedule an office visit by 9/13/2023 for refills.

## 2023-10-09 ENCOUNTER — OFFICE VISIT (OUTPATIENT)
Dept: FAMILY MEDICINE | Facility: CLINIC | Age: 68
End: 2023-10-09
Payer: COMMERCIAL

## 2023-10-09 VITALS
SYSTOLIC BLOOD PRESSURE: 134 MMHG | WEIGHT: 198 LBS | BODY MASS INDEX: 44.54 KG/M2 | DIASTOLIC BLOOD PRESSURE: 82 MMHG | RESPIRATION RATE: 20 BRPM | OXYGEN SATURATION: 97 % | HEART RATE: 50 BPM | HEIGHT: 56 IN

## 2023-10-09 DIAGNOSIS — Z78.0 MENOPAUSE: ICD-10-CM

## 2023-10-09 DIAGNOSIS — E66.01 MORBID OBESITY (H): ICD-10-CM

## 2023-10-09 DIAGNOSIS — E78.5 HYPERLIPIDEMIA LDL GOAL <130: ICD-10-CM

## 2023-10-09 DIAGNOSIS — M17.0 PRIMARY OSTEOARTHRITIS OF BOTH KNEES: ICD-10-CM

## 2023-10-09 DIAGNOSIS — Z00.00 ENCOUNTER FOR MEDICARE ANNUAL WELLNESS EXAM: Primary | ICD-10-CM

## 2023-10-09 DIAGNOSIS — I10 ESSENTIAL HYPERTENSION, BENIGN: ICD-10-CM

## 2023-10-09 DIAGNOSIS — Z13.220 LIPID SCREENING: ICD-10-CM

## 2023-10-09 DIAGNOSIS — Z12.11 SCREEN FOR COLON CANCER: ICD-10-CM

## 2023-10-09 LAB
ALBUMIN SERPL BCG-MCNC: 4.1 G/DL (ref 3.5–5.2)
ALP SERPL-CCNC: 83 U/L (ref 35–104)
ALT SERPL W P-5'-P-CCNC: 53 U/L (ref 0–50)
ANION GAP SERPL CALCULATED.3IONS-SCNC: 10 MMOL/L (ref 7–15)
AST SERPL W P-5'-P-CCNC: 52 U/L (ref 0–45)
BILIRUB SERPL-MCNC: 0.6 MG/DL
BUN SERPL-MCNC: 17.1 MG/DL (ref 8–23)
CALCIUM SERPL-MCNC: 9.6 MG/DL (ref 8.8–10.2)
CHLORIDE SERPL-SCNC: 102 MMOL/L (ref 98–107)
CHOLEST SERPL-MCNC: 179 MG/DL
CREAT SERPL-MCNC: 0.9 MG/DL (ref 0.51–0.95)
DEPRECATED HCO3 PLAS-SCNC: 26 MMOL/L (ref 22–29)
EGFRCR SERPLBLD CKD-EPI 2021: 68 ML/MIN/1.73M2
GLUCOSE SERPL-MCNC: 104 MG/DL (ref 70–99)
HDLC SERPL-MCNC: 64 MG/DL
LDLC SERPL CALC-MCNC: 90 MG/DL
NONHDLC SERPL-MCNC: 115 MG/DL
POTASSIUM SERPL-SCNC: 3.9 MMOL/L (ref 3.4–5.3)
PROT SERPL-MCNC: 7.4 G/DL (ref 6.4–8.3)
SODIUM SERPL-SCNC: 138 MMOL/L (ref 135–145)
TRIGL SERPL-MCNC: 124 MG/DL

## 2023-10-09 PROCEDURE — 36415 COLL VENOUS BLD VENIPUNCTURE: CPT | Performed by: FAMILY MEDICINE

## 2023-10-09 PROCEDURE — 80053 COMPREHEN METABOLIC PANEL: CPT | Performed by: FAMILY MEDICINE

## 2023-10-09 PROCEDURE — 99397 PER PM REEVAL EST PAT 65+ YR: CPT | Performed by: FAMILY MEDICINE

## 2023-10-09 PROCEDURE — 99213 OFFICE O/P EST LOW 20 MIN: CPT | Mod: 25 | Performed by: FAMILY MEDICINE

## 2023-10-09 PROCEDURE — 80061 LIPID PANEL: CPT | Performed by: FAMILY MEDICINE

## 2023-10-09 RX ORDER — ATENOLOL AND CHLORTHALIDONE TABLET 100; 25 MG/1; MG/1
1 TABLET ORAL DAILY
Qty: 90 TABLET | Refills: 4 | Status: SHIPPED | OUTPATIENT
Start: 2023-10-09 | End: 2024-09-11

## 2023-10-09 RX ORDER — CHOLECALCIFEROL (VITAMIN D3) 50 MCG
1 TABLET ORAL DAILY
Qty: 90 TABLET | Refills: 1 | Status: SHIPPED | OUTPATIENT
Start: 2023-10-09

## 2023-10-09 RX ORDER — ATORVASTATIN CALCIUM 10 MG/1
10 TABLET, FILM COATED ORAL DAILY
Qty: 90 TABLET | Refills: 4 | Status: ON HOLD | OUTPATIENT
Start: 2023-10-09 | End: 2024-09-04

## 2023-10-09 RX ORDER — ATORVASTATIN CALCIUM 10 MG/1
10 TABLET, FILM COATED ORAL DAILY
Qty: 90 TABLET | Refills: 4 | Status: SHIPPED | OUTPATIENT
Start: 2023-10-09 | End: 2023-10-09

## 2023-10-09 ASSESSMENT — ENCOUNTER SYMPTOMS
FEVER: 0
CHILLS: 0
HEMATOCHEZIA: 0
WEAKNESS: 0
MYALGIAS: 1
EYE PAIN: 0
PARESTHESIAS: 0
PALPITATIONS: 0
HEMATURIA: 0
HEARTBURN: 0
CONSTIPATION: 0
FREQUENCY: 0
SORE THROAT: 0
ABDOMINAL PAIN: 0
HEADACHES: 0
DIZZINESS: 0
NERVOUS/ANXIOUS: 0
COUGH: 0
ARTHRALGIAS: 1
DYSURIA: 0
SHORTNESS OF BREATH: 0
DIARRHEA: 0
JOINT SWELLING: 0
NAUSEA: 0

## 2023-10-09 ASSESSMENT — ACTIVITIES OF DAILY LIVING (ADL): CURRENT_FUNCTION: NO ASSISTANCE NEEDED

## 2023-10-09 NOTE — PATIENT INSTRUCTIONS
No change in medicine today   Please make mauro for Dexa scan for bone density , take vitamin D daily   Think about covid vaccine   Colon cancer kit will come to your home please male it back wit stool sample inside   All refill done   Follow up one yearr     Taylor Wilkins MD     Patient Education   Personalized Prevention Plan  You are due for the preventive services outlined below.  Your care team is available to assist you in scheduling these services.  If you have already completed any of these items, please share that information with your care team to update in your medical record.  Health Maintenance Due   Topic Date Due    Osteoporosis Screening  Never done    Colorectal Cancer Screening  Never done    Zoster (Shingles) Vaccine (1 of 2) Never done    Diptheria Tetanus Pertussis (DTAP/TDAP/TD) Vaccine (2 - Td or Tdap) 11/19/2019    FALL RISK ASSESSMENT  10/08/2022    PHQ-2 (once per calendar year)  01/01/2023    COVID-19 Vaccine (3 - 2023-24 season) 09/01/2023    ANNUAL REVIEW OF HM ORDERS  09/12/2023

## 2023-10-09 NOTE — PROGRESS NOTES
SUBJECTIVE:   Konrad Mcarthur 68 year old  who presents for Preventive Visit.      Patient has been advised of split billing requirements and indicates understanding: Yes   Are you in the first 12 months of your Medicare coverage?  No    ASSESSMENT / PLAN:   Konrad was seen today for medicare visit.    Diagnoses and all orders for this visit:    Encounter for Medicare annual wellness exam    Essential hypertension, benign  Comments:  Blood pressure is well controlled on current dose we will refill the medication patient currently denies any side effect  Orders:  -     Discontinue: atorvastatin (LIPITOR) 10 MG tablet; Take 1 tablet (10 mg) by mouth daily  -     atorvastatin (LIPITOR) 10 MG tablet; Take 1 tablet (10 mg) by mouth daily    Menopause  -     DEXA HIP/PELVIS/SPINE - Future; Future    Screen for colon cancer  -     COLOG[employer]); Future    Morbid obesity (H)  Comments:  Not much activity stays home advised on walking inside the home and limit the intake of snacks and decrease portion size    Primary osteoarthritis of both knees  Comments:  Referral to orthopedics to get the x-ray done and if they suggest cortisone shot or Synvisc    Lipid screening  -     Lipid Profile; Future  -     Lipid Profile    Hyperlipidemia LDL goal <130  Comments:  Taking Lipitor daily denies any side effect    Other orders  -     PRIMARY CARE FOLLOW-UP SCHEDULING; Future  -     COVID-19 12+ (2023-24) (PFIZER); Future  -     REVIEW OF HEALTH MAINTENANCE PROTOCOL ORDERS  -     ZOSTER RECOMBINANT ADJUVANTED (SHINGRIX); Future  -     vitamin D3 (CHOLECALCIFEROL) 50 mcg (2000 units) tablet; Take 1 tablet (50 mcg) by mouth daily  -     naproxen (NAPROSYN) 375 MG tablet; Take 1 tablet (375 mg) by mouth 2 times daily (with meals)  -     atenolol-chlorthalidone (TENORETIC) 100-25 MG tablet; Take 1 tablet by mouth daily  -     Orthopedic  Referral; Future  -     Comprehensive metabolic panel (BMP + Alb, Alk Phos, ALT, AST,  "Total. Bili, TP); Future  -     Comprehensive metabolic panel (BMP + Alb, Alk Phos, ALT, AST, Total. Bili, TP)          Healthy Habits:     In general, how would you rate your overall health?  Excellent    Frequency of exercise:  2-3 days/week    Duration of exercise:  30-45 minutes    Do you usually eat at least 4 servings of fruit and vegetables a day, include whole grains    & fiber and avoid regularly eating high fat or \"junk\" foods?  Yes    Taking medications regularly:  Yes    Medication side effects:  Not applicable    Ability to successfully perform activities of daily living:  No assistance needed    Home Safety:  No safety concerns identified    Hearing Impairment:  No hearing concerns    In the past 6 months, have you been bothered by leaking of urine?  No    In general, how would you rate your overall mental or emotional health?  Good    Additional concerns today:  Yes        Hyperlipidemia Follow-Up    Are you regularly taking any medication or supplement to lower your cholesterol?   Yes- Lipitor  Are you having muscle aches or other side effects that you think could be caused by your cholesterol lowering medication?  No    Hypertension Follow-up    Do you check your blood pressure regularly outside of the clinic? No   Are you following a low salt diet? Yes  Are your blood pressures ever more than 140 on the top number (systolic) OR more   than 90 on the bottom number (diastolic), for example 140/90? Yes  Do you feel safe in your environment? Yes    Have you ever done Advance Care Planning? (For example, a Health Directive, POLST, or a discussion with a medical provider or your loved ones about your wishes): No, advance care planning information given to patient to review.  Advanced care planning was discussed at today's visit.       Fall risk  Fallen 2 or more times in the past year?: No  Any fall with injury in the past year?: No    Cognitive Screening non english speaking 3 words given in Hmong " language     1) Repeat 3 items (Leader, Season, Table)    2) Clock draw:  Unable to due per not knowing english  3) 3 item recall: Recalls 3 objects  Results:  passed     Mini-CogTM Copyright [employer]. Licensed by the author for use in [facility]; reprinted with permission (nick@[facility]). All rights reserved.      Do you have sleep apnea, excessive snoring or daytime drowsiness? : no    Reviewed and updated as needed this visit by clinical staff   Tobacco  Allergies  Meds  Problems  Med Hx  Surg Hx  Fam Hx          Reviewed and updated as needed this visit by Provider   Tobacco  Allergies  Meds  Problems  Med Hx  Surg Hx  Fam Hx         Social History     Tobacco Use    Smoking status: Never    Smokeless tobacco: Never   Substance Use Topics    Alcohol use: Not on file           10/9/2023    10:36 AM   Alcohol Use   Prescreen: >3 drinks/day or >7 drinks/week? Not Applicable       Do you have a current opioid prescription? No  Do you use any other controlled substances or medications that are not prescribed by a provider? None    Current providers sharing in care for this patient include:   Patient Care Team:  Taylor Wilkins MD as PCP - General (Family Medicine)  Taylor Wilkins MD as Assigned PCP    The following health maintenance items are reviewed in Epic and correct as of today:  Health Maintenance Due   Topic Date Due    DEXA  Never done    COLORECTAL CANCER SCREENING  Never done    ZOSTER IMMUNIZATION (1 of 2) Never done    DTAP/TDAP/TD IMMUNIZATION (2 - Td or Tdap) 11/19/2019    COVID-19 Vaccine (3 - 2023-24 season) 09/01/2023     Lab work is in process  Labs reviewed in EPIC  BP Readings from Last 3 Encounters:   10/09/23 134/82   09/12/22 (!) 170/100   10/08/21 (!) 160/100    Wt Readings from Last 3 Encounters:   10/09/23 89.8 kg (198 lb)   09/12/22 93.1 kg (205 lb 4.8 oz)   10/08/21 92.3 kg (203 lb 6.4 oz)                Patient Active Problem List   Diagnosis    Essential  hypertension, benign    H/O: hysterectomy    Primary osteoarthritis of both knees    Morbid obesity (H)    Non-compliance with treatment     Past Surgical History:   Procedure Laterality Date    HYSTERECTOMY      [place], around 2000    HYSTERECTOMY         Social History     Tobacco Use    Smoking status: Never    Smokeless tobacco: Never   Substance Use Topics    Alcohol use: Not on file     Family History   Problem Relation Age of Onset    Diabetes Sister            Current Outpatient Medications   Medication Sig Dispense Refill    atenolol-chlorthalidone (TENORETIC) 100-25 MG tablet Take 1 tablet by mouth daily 90 tablet 4    atorvastatin (LIPITOR) 10 MG tablet Take 1 tablet (10 mg) by mouth daily 90 tablet 4    naproxen (NAPROSYN) 375 MG tablet Take 1 tablet (375 mg) by mouth 2 times daily (with meals) 60 tablet 0    vitamin D3 (CHOLECALCIFEROL) 50 mcg (2000 units) tablet Take 1 tablet (50 mcg) by mouth daily 90 tablet 1     Allergies   Allergen Reactions    Influenza Virus Vaccine      Cough unstopped per pt when first received it in 2009, ever since then, don't get flu shot.             Review of Systems   Constitutional:  Negative for chills and fever.   HENT:  Negative for congestion, ear pain, hearing loss and sore throat.    Eyes:  Negative for pain and visual disturbance.   Respiratory:  Negative for cough and shortness of breath.    Cardiovascular:  Negative for chest pain, palpitations and peripheral edema.   Gastrointestinal:  Negative for abdominal pain, constipation, diarrhea, heartburn, hematochezia and nausea.   Genitourinary:  Negative for dysuria, frequency, genital sores, hematuria and urgency.   Musculoskeletal:  Positive for arthralgias and myalgias. Negative for joint swelling.   Skin:  Negative for rash.   Neurological:  Negative for dizziness, weakness, headaches and paresthesias.   Psychiatric/Behavioral:  Negative for mood changes. The patient is not nervous/anxious.   "      Constitutional, HEENT, cardiovascular, pulmonary, gi and gu systems are negative, except as otherwise noted.    OBJECTIVE:   /82   Pulse 50   Resp 20   Ht 1.41 m (4' 7.5\")   Wt 89.8 kg (198 lb)   SpO2 97%   BMI 45.19 kg/m     Estimated body mass index is 45.19 kg/m  as calculated from the following:    Height as of this encounter: 1.41 m (4' 7.5\").    Weight as of this encounter: 89.8 kg (198 lb).  Physical Exam    GENERAL: healthy, alert and no distress  EYES: Eyes grossly normal to inspection, PERRL and conjunctivae and sclerae normal  HENT: ear canals and TM's normal, nose and mouth without ulcers or lesions  NECK: no adenopathy, no asymmetry, masses, or scars and thyroid normal to palpation  RESP: lungs clear to auscultation - no rales, rhonchi or wheezes  CV: regular rate and rhythm, normal S1 S2, no S3 or S4, no murmur, click or rub, no peripheral edema and peripheral pulses strong  MS: no gross musculoskeletal defects noted, no edema  PSYCH: mentation appears normal, affect normal    Diagnostic Test Results:  Labs reviewed in Epic  Patient has been advised of split billing requirements and indicates understanding: Yes    COUNSELING:  Reviewed preventive health counseling, as reflected in patient instructions       Regular exercise       Healthy diet/nutrition       Vision screening       Hearing screening       Dental care       Bladder control       Fall risk prevention       Aspirin prophylaxis        Osteoporosis prevention/bone health       Colon cancer screening       Advanced Planning     Estimated body mass index is 45.19 kg/m  as calculated from the following:    Height as of this encounter: 1.41 m (4' 7.5\").    Weight as of this encounter: 89.8 kg (198 lb).    Weight management plan: Discussed healthy diet and exercise guidelines    She reports that she has never smoked. She has never used smokeless tobacco.      Appropriate preventive services were discussed with this patient, " including applicable screening as appropriate for cardiovascular disease, diabetes, osteopenia/osteoporosis, and glaucoma.  As appropriate for age/gender, discussed screening for colorectal cancer, prostate cancer, breast cancer, and cervical cancer. Checklist reviewing preventive services available has been given to the patient.    Reviewed patients plan of care and provided an AVS. The Basic Care Plan (routine screening as documented in Health Maintenance) for Ghada meets the Care Plan requirement. This Care Plan has been established and reviewed with the other:luciana .    Taylro Wilkins MD  [facility]    Identified Health Risks:

## 2023-10-10 ENCOUNTER — TELEPHONE (OUTPATIENT)
Dept: FAMILY MEDICINE | Facility: CLINIC | Age: 68
End: 2023-10-10
Payer: COMMERCIAL

## 2023-10-16 ENCOUNTER — LAB (OUTPATIENT)
Dept: FAMILY MEDICINE | Facility: CLINIC | Age: 68
End: 2023-10-16
Payer: COMMERCIAL

## 2023-10-16 DIAGNOSIS — Z12.11 SCREEN FOR COLON CANCER: ICD-10-CM

## 2024-05-29 NOTE — LETTER
August 17, 2018      Konrad Mcarthur  [address] [place] [address]        Dear Konrad,    The lab tests suggest mild kidney disease as a result of the high blood pressure.     Please see below for your test results.    Resulted Orders   Basic Metabolic Panel ([facility]) - Results < 1 hr   Result Value Ref Range    Glucose 114.0 (H) 60.0 - 109.0 mg/dL    Urea Nitrogen 15.0 7.0 - 30.0 mg/dL    Creatinine 1.0 0.6 - 1.3 mg/dL    Sodium 142.0 133.0 - 144.0 mmol/L    Potassium 3.6 3.4 - 5.3 mmol/L    Chloride 106.0 94.0 - 109.0 mmol/L    Carbon Dioxide 28.0 20.0 - 32.0 mmol/L    Calcium 9.6 8.5 - 10.4 mg/dL    eGFR Calculated (Non Black Reference) 59.1 (L) >60.0 mL/min    eGFR Calculated (Black Reference) 71.6 >60.0 mL/min   CBC with Plt ([facility])   Result Value Ref Range    WBC 9.2 4.0 - 11.0 K/uL    RBC 4.67 3.80 - 5.20 M/uL    Hemoglobin 14.8 11.7 - 15.7 g/dL    Hematocrit 45.7 35.0 - 47.0 %    MCV 97.9 78.0 - 100.0 fL    MCH 31.7 26.5 - 35.0 pg    MCHC 32.4 32.0 - 36.0 g/dL    Platelets 137.0 (L) 150.0 - 450.0 K/uL       If you have any questions, please call the clinic to make an appointment.    Sincerely,    Nick Ramirez MD  
Male

## 2024-09-02 ENCOUNTER — APPOINTMENT (OUTPATIENT)
Dept: MRI IMAGING | Facility: HOSPITAL | Age: 69
DRG: 065 | End: 2024-09-02
Attending: EMERGENCY MEDICINE
Payer: COMMERCIAL

## 2024-09-02 ENCOUNTER — HOSPITAL ENCOUNTER (INPATIENT)
Facility: HOSPITAL | Age: 69
LOS: 2 days | Discharge: HOME | DRG: 065 | End: 2024-09-04
Attending: EMERGENCY MEDICINE | Admitting: INTERNAL MEDICINE
Payer: COMMERCIAL

## 2024-09-02 ENCOUNTER — OFFICE VISIT (OUTPATIENT)
Dept: FAMILY MEDICINE | Facility: CLINIC | Age: 69
End: 2024-09-02
Payer: COMMERCIAL

## 2024-09-02 VITALS
SYSTOLIC BLOOD PRESSURE: 157 MMHG | HEART RATE: 87 BPM | OXYGEN SATURATION: 98 % | DIASTOLIC BLOOD PRESSURE: 107 MMHG | RESPIRATION RATE: 18 BRPM

## 2024-09-02 DIAGNOSIS — I16.0 HYPERTENSIVE URGENCY: Primary | ICD-10-CM

## 2024-09-02 DIAGNOSIS — R29.898 RIGHT ARM WEAKNESS: ICD-10-CM

## 2024-09-02 DIAGNOSIS — I10 ESSENTIAL HYPERTENSION, BENIGN: ICD-10-CM

## 2024-09-02 DIAGNOSIS — Z91.199 NON-COMPLIANCE WITH TREATMENT: ICD-10-CM

## 2024-09-02 DIAGNOSIS — M17.0 PRIMARY OSTEOARTHRITIS OF BOTH KNEES: Primary | ICD-10-CM

## 2024-09-02 DIAGNOSIS — R29.810 FACIAL DROOP: ICD-10-CM

## 2024-09-02 DIAGNOSIS — E66.01 MORBID OBESITY (H): ICD-10-CM

## 2024-09-02 DIAGNOSIS — I63.9 CEREBROVASCULAR ACCIDENT (CVA), UNSPECIFIED MECHANISM (H): ICD-10-CM

## 2024-09-02 LAB
ANION GAP SERPL CALCULATED.3IONS-SCNC: 11 MMOL/L (ref 7–15)
BASOPHILS # BLD AUTO: 0 10E3/UL (ref 0–0.2)
BASOPHILS NFR BLD AUTO: 1 %
BUN SERPL-MCNC: 13.8 MG/DL (ref 8–23)
CALCIUM SERPL-MCNC: 9.1 MG/DL (ref 8.8–10.4)
CHLORIDE SERPL-SCNC: 103 MMOL/L (ref 98–107)
CHOLEST SERPL-MCNC: 174 MG/DL
CREAT SERPL-MCNC: 0.78 MG/DL (ref 0.51–0.95)
EGFRCR SERPLBLD CKD-EPI 2021: 81 ML/MIN/1.73M2
EOSINOPHIL # BLD AUTO: 0.1 10E3/UL (ref 0–0.7)
EOSINOPHIL NFR BLD AUTO: 1 %
ERYTHROCYTE [DISTWIDTH] IN BLOOD BY AUTOMATED COUNT: 13.4 % (ref 10–15)
GLUCOSE BLDC GLUCOMTR-MCNC: 162 MG/DL (ref 70–99)
GLUCOSE BLDC GLUCOMTR-MCNC: 96 MG/DL (ref 70–99)
GLUCOSE BLDC GLUCOMTR-MCNC: 98 MG/DL (ref 70–99)
GLUCOSE SERPL-MCNC: 100 MG/DL (ref 70–99)
HBA1C MFR BLD: 6.1 %
HCO3 SERPL-SCNC: 25 MMOL/L (ref 22–29)
HCT VFR BLD AUTO: 42.3 % (ref 35–47)
HDLC SERPL-MCNC: 65 MG/DL
HGB BLD-MCNC: 14 G/DL (ref 11.7–15.7)
HOLD SPECIMEN: NORMAL
IMM GRANULOCYTES # BLD: 0 10E3/UL
IMM GRANULOCYTES NFR BLD: 0 %
LDLC SERPL CALC-MCNC: 91 MG/DL
LYMPHOCYTES # BLD AUTO: 2 10E3/UL (ref 0.8–5.3)
LYMPHOCYTES NFR BLD AUTO: 36 %
MCH RBC QN AUTO: 32.1 PG (ref 26.5–33)
MCHC RBC AUTO-ENTMCNC: 33.1 G/DL (ref 31.5–36.5)
MCV RBC AUTO: 97 FL (ref 78–100)
MONOCYTES # BLD AUTO: 0.5 10E3/UL (ref 0–1.3)
MONOCYTES NFR BLD AUTO: 9 %
NEUTROPHILS # BLD AUTO: 3 10E3/UL (ref 1.6–8.3)
NEUTROPHILS NFR BLD AUTO: 54 %
NONHDLC SERPL-MCNC: 109 MG/DL
NRBC # BLD AUTO: 0 10E3/UL
NRBC BLD AUTO-RTO: 0 /100
PLATELET # BLD AUTO: 207 10E3/UL (ref 150–450)
POTASSIUM SERPL-SCNC: 3.8 MMOL/L (ref 3.4–5.3)
RBC # BLD AUTO: 4.36 10E6/UL (ref 3.8–5.2)
SODIUM SERPL-SCNC: 139 MMOL/L (ref 135–145)
TRIGL SERPL-MCNC: 90 MG/DL
TROPONIN T SERPL HS-MCNC: 14 NG/L
WBC # BLD AUTO: 5.6 10E3/UL (ref 4–11)

## 2024-09-02 PROCEDURE — 70549 MR ANGIOGRAPH NECK W/O&W/DYE: CPT

## 2024-09-02 PROCEDURE — 120N000001 HC R&B MED SURG/OB

## 2024-09-02 PROCEDURE — 85025 COMPLETE CBC W/AUTO DIFF WBC: CPT | Performed by: EMERGENCY MEDICINE

## 2024-09-02 PROCEDURE — 80048 BASIC METABOLIC PNL TOTAL CA: CPT | Performed by: EMERGENCY MEDICINE

## 2024-09-02 PROCEDURE — 99213 OFFICE O/P EST LOW 20 MIN: CPT | Performed by: FAMILY MEDICINE

## 2024-09-02 PROCEDURE — 99222 1ST HOSP IP/OBS MODERATE 55: CPT | Mod: FS | Performed by: INTERNAL MEDICINE

## 2024-09-02 PROCEDURE — 83036 HEMOGLOBIN GLYCOSYLATED A1C: CPT

## 2024-09-02 PROCEDURE — 82962 GLUCOSE BLOOD TEST: CPT

## 2024-09-02 PROCEDURE — 84484 ASSAY OF TROPONIN QUANT: CPT

## 2024-09-02 PROCEDURE — 36415 COLL VENOUS BLD VENIPUNCTURE: CPT | Performed by: EMERGENCY MEDICINE

## 2024-09-02 PROCEDURE — A9585 GADOBUTROL INJECTION: HCPCS | Performed by: EMERGENCY MEDICINE

## 2024-09-02 PROCEDURE — 255N000002 HC RX 255 OP 636: Performed by: EMERGENCY MEDICINE

## 2024-09-02 PROCEDURE — 82465 ASSAY BLD/SERUM CHOLESTEROL: CPT

## 2024-09-02 PROCEDURE — 93005 ELECTROCARDIOGRAM TRACING: CPT | Performed by: EMERGENCY MEDICINE

## 2024-09-02 PROCEDURE — 99207 PR APP CREDIT; MD BILLING SHARED VISIT: CPT | Mod: FS

## 2024-09-02 PROCEDURE — 250N000013 HC RX MED GY IP 250 OP 250 PS 637

## 2024-09-02 PROCEDURE — 99285 EMERGENCY DEPT VISIT HI MDM: CPT | Mod: 25

## 2024-09-02 PROCEDURE — 70553 MRI BRAIN STEM W/O & W/DYE: CPT

## 2024-09-02 PROCEDURE — 250N000013 HC RX MED GY IP 250 OP 250 PS 637: Performed by: EMERGENCY MEDICINE

## 2024-09-02 PROCEDURE — 70544 MR ANGIOGRAPHY HEAD W/O DYE: CPT

## 2024-09-02 RX ORDER — LABETALOL HYDROCHLORIDE 5 MG/ML
10-20 INJECTION, SOLUTION INTRAVENOUS EVERY 10 MIN PRN
Status: DISCONTINUED | OUTPATIENT
Start: 2024-09-02 | End: 2024-09-04 | Stop reason: HOSPADM

## 2024-09-02 RX ORDER — ATORVASTATIN CALCIUM 10 MG/1
10 TABLET, FILM COATED ORAL DAILY
Status: DISCONTINUED | OUTPATIENT
Start: 2024-09-02 | End: 2024-09-03

## 2024-09-02 RX ORDER — ASPIRIN 81 MG/1
81 TABLET ORAL DAILY
Status: DISCONTINUED | OUTPATIENT
Start: 2024-09-03 | End: 2024-09-04 | Stop reason: HOSPADM

## 2024-09-02 RX ORDER — CLOPIDOGREL BISULFATE 75 MG/1
300 TABLET ORAL ONCE
Status: COMPLETED | OUTPATIENT
Start: 2024-09-02 | End: 2024-09-02

## 2024-09-02 RX ORDER — ASPIRIN 81 MG/1
81 TABLET, CHEWABLE ORAL ONCE
Status: COMPLETED | OUTPATIENT
Start: 2024-09-02 | End: 2024-09-02

## 2024-09-02 RX ORDER — CLOPIDOGREL BISULFATE 75 MG/1
75 TABLET ORAL DAILY
Status: DISCONTINUED | OUTPATIENT
Start: 2024-09-03 | End: 2024-09-04 | Stop reason: HOSPADM

## 2024-09-02 RX ORDER — GADOBUTROL 604.72 MG/ML
9 INJECTION INTRAVENOUS ONCE
Status: COMPLETED | OUTPATIENT
Start: 2024-09-02 | End: 2024-09-02

## 2024-09-02 RX ORDER — HYDRALAZINE HYDROCHLORIDE 20 MG/ML
10-20 INJECTION INTRAMUSCULAR; INTRAVENOUS
Status: DISCONTINUED | OUTPATIENT
Start: 2024-09-02 | End: 2024-09-04 | Stop reason: HOSPADM

## 2024-09-02 RX ORDER — LIDOCAINE 40 MG/G
CREAM TOPICAL
Status: DISCONTINUED | OUTPATIENT
Start: 2024-09-02 | End: 2024-09-04 | Stop reason: HOSPADM

## 2024-09-02 RX ORDER — ASPIRIN 81 MG/1
81 TABLET, CHEWABLE ORAL DAILY
Status: DISCONTINUED | OUTPATIENT
Start: 2024-09-03 | End: 2024-09-04 | Stop reason: HOSPADM

## 2024-09-02 RX ADMIN — GADOBUTROL 9 ML: 604.72 INJECTION INTRAVENOUS at 16:08

## 2024-09-02 RX ADMIN — ATORVASTATIN CALCIUM 10 MG: 10 TABLET, FILM COATED ORAL at 20:09

## 2024-09-02 RX ADMIN — ASPIRIN 81 MG CHEWABLE TABLET 81 MG: 81 TABLET CHEWABLE at 17:33

## 2024-09-02 RX ADMIN — CLOPIDOGREL BISULFATE 300 MG: 75 TABLET ORAL at 17:33

## 2024-09-02 ASSESSMENT — ACTIVITIES OF DAILY LIVING (ADL)
ADLS_ACUITY_SCORE: 35
ADLS_ACUITY_SCORE: 22
ADLS_ACUITY_SCORE: 22
ADLS_ACUITY_SCORE: 21
ADLS_ACUITY_SCORE: 35
ADLS_ACUITY_SCORE: 22
ADLS_ACUITY_SCORE: 35
ADLS_ACUITY_SCORE: 21

## 2024-09-02 ASSESSMENT — COLUMBIA-SUICIDE SEVERITY RATING SCALE - C-SSRS
1. IN THE PAST MONTH, HAVE YOU WISHED YOU WERE DEAD OR WISHED YOU COULD GO TO SLEEP AND NOT WAKE UP?: NO
6. HAVE YOU EVER DONE ANYTHING, STARTED TO DO ANYTHING, OR PREPARED TO DO ANYTHING TO END YOUR LIFE?: NO
2. HAVE YOU ACTUALLY HAD ANY THOUGHTS OF KILLING YOURSELF IN THE PAST MONTH?: NO

## 2024-09-02 NOTE — ED PROVIDER NOTES
EMERGENCY DEPARTMENT ENCOUNTER      NAME: Konrad Mcarthur  AGE: 69 year old female  YOB: 1955  MRN: [id number]  EVALUATION DATE & TIME: 9/2/2024 12:30 PM    PCP: Taylor Wilkins    ED PROVIDER: Radha Alfaro M.D.      Chief Complaint   Patient presents with    One-sided Weakness         FINAL IMPRESSION:  1. Right hand weakness          ED COURSE & MEDICAL DECISION MAKING:    ED Course as of 09/02/24 1452   Mon Sep 02, 2024   1451 CBC and chemistry reassruingly WNL, patient signed out to PM ED MD pending MRI brain       12:32 PM I met with the patient to gather history and to perform my initial exam. We discussed plans for the ED course, including diagnostic testing and treatment. Patient's grandson interpreted for the language Hmong at bedside.     Pertinent Labs & Imaging studies reviewed. (See chart for details)    Medical Decision Making  Obtained supplemental history:Supplemental history obtained?: Documented in chart and Family Member/Significant Other  Reviewed external records: External records reviewed?: Documented in chart and Outpatient Record: [facility] visit on 09/02/24   Care impacted by chronic illness:Hypertension and Other: Morbid Obesity, Osteoarthritis  Care significantly affected by social determinants of health:N/A  Did you consider but not order tests?: Work up considered but not performed and documented in chart, if applicable  Did you interpret images independently?: Independent interpretation of ECG and images noted in documentation, when applicable.  Consultation discussion with other provider:Did you involve another provider (consultant, MH, pharmacy, etc.)?: I discussed the care with another health care provider, see documentation for details.  Admission considered. Patient was signed out to the oncoming physician, disposition pending.  No MIPS measures identified.      At the conclusion of the encounter I discussed the results of all of the tests and the  disposition. The questions were answered. The patient or family acknowledged understanding and was agreeable with the care plan.     MEDICATIONS GIVEN IN THE EMERGENCY:  Medications - No data to display    NEW PRESCRIPTIONS STARTED AT TODAY'S ER VISIT  New Prescriptions    No medications on file          =================================================================    HPI      Konrad Mcarthur is a 69 year old female with PMHx of HTN, osteoarthritis, and morbid obesity who presents to the ED today via walk-in for evaluation of weakness.     Per Chart Review, patient was seen at [facility] on 09/02/24 for evaluation of right arm weakness. Patient was found to have blood pressure of 157/107 and so patient was prompted to immediately head to the [facility] ER for further evaluation of hypertensive urgency. Patient was noted to have 2 out of 5  strength and left facial droop in the clinic. Patient reported she stopped her BP meds 4 days ago because her systolic blood pressures were good with SP in 120s.     Patient's grandson reports that last night when he arrived home the patient reported to him that after she took a walk at approximately 11:00 AM she developed new right hand weakness and was unable to clench her right hand. He states that then today the patient reported that she continued to have right hand weakness, however, it has mildly alleviated and she can now grasp items with the right hand. He states that the patient denies having any falls or injuries recently. He states the patient denies having any metal in her body. He also states that the patient denies having any other complications at this time.     Patient's grandson endorses the patient being right hand dominant. He denies any patient facial asymmetry in the ED. He endorses the patient being prescribed medication for blood pressure, but he denies her taking any of this medication for 4 days.         REVIEW OF  SYSTEMS   All other systems reviewed and are negative except as noted above in HPI.    PAST MEDICAL HISTORY:  Past Medical History:   Diagnosis Date    Hypertension        PAST SURGICAL HISTORY:  Past Surgical History:   Procedure Laterality Date    HYSTERECTOMY      [place], around 2000    HYSTERECTOMY         CURRENT MEDICATIONS:    atenolol-chlorthalidone (TENORETIC) 100-25 MG tablet  atorvastatin (LIPITOR) 10 MG tablet  naproxen (NAPROSYN) 375 MG tablet  vitamin D3 (CHOLECALCIFEROL) 50 mcg (2000 units) tablet        ALLERGIES:  Allergies   Allergen Reactions    Influenza Virus Vaccine      Cough unstopped per pt when first received it in 2009, ever since then, don't get flu shot.       FAMILY HISTORY:  Family History   Problem Relation Age of Onset    Diabetes Sister        SOCIAL HISTORY:   Social History     Socioeconomic History    Marital status:      Spouse name: None    Number of children: None    Years of education: None    Highest education level: None   Tobacco Use    Smoking status: Never    Smokeless tobacco: Never     Social Determinants of Health     Financial Resource Strain: Low Risk  (10/9/2023)    Financial Resource Strain     Within the past 12 months, have you or your family members you live with been unable to get utilities (heat, electricity) when it was really needed?: No   Food Insecurity: Low Risk  (10/9/2023)    Food Insecurity     Within the past 12 months, did you worry that your food would run out before you got money to buy more?: No     Within the past 12 months, did the food you bought just not last and you didn t have money to get more?: No   Transportation Needs: Low Risk  (10/9/2023)    Transportation Needs     Within the past 12 months, has lack of transportation kept you from medical appointments, getting your medicines, non-medical meetings or appointments, work, or from getting things that you need?: No   Housing Stability: Low Risk  (10/9/2023)    Housing Stability      Do you have housing? : Yes     Are you worried about losing your housing?: No       VITALS:  Patient Vitals for the past 24 hrs:   BP Temp Temp src Pulse Resp SpO2 Weight   09/02/24 1227 (!) 187/88 98  F (36.7  C) Oral 72 18 97 % 92.9 kg (204 lb 11.2 oz)       PHYSICAL EXAM    GENERAL: Awake, alert.  In no acute distress.   HEENT: Normocephalic, atraumatic.  Pupils equal, round and reactive.  Conjunctiva normal.  EOMI.  NECK: No stridor or apparent deformity.  PULMONARY: Symmetrical breath sounds without distress.  Lungs clear to auscultation bilaterally without wheezes, rhonchi or rales.  CARDIO: Regular rate and rhythm.  No significant murmur, rub or gallop.  Radial pulses strong and symmetrical.  ABDOMINAL: Abdomen soft, non-distended and non-tender to palpation.  No CVAT, no palpable hepatosplenomegaly.  EXTREMITIES: No lower extremity swelling or edema. Bilateral radial pulses 2+.   NEURO: Alert and oriented to person, place and time.  Cranial nerves grossly intact. Right leg drift present. Bilateral  strength 5/5. Bilateral dorsal hand, palmar hands, index fingers, and middle fingers with equal sensation.   PSYCH: Normal mood and affect  SKIN: No rashes     National Institutes of Health Stroke Scale  Exam Interval: Baseline   Score    Level of consciousness: (0)   Alert, keenly responsive    LOC questions: (0)   Answers both questions correctly    LOC commands: (0)   Performs both tasks correctly    Best gaze: (0)   Normal    Visual: (0)   No visual loss    Facial palsy: (0)   Normal symmetrical movements    Motor arm (left): (0)   No drift    Motor arm (right): (0)   No drift    Motor leg (left): (0)   No drift    Motor leg (right): (1)   Drift    Limb ataxia: (0)   Absent    Sensory: (0)   Normal- no sensory loss    Best language: (0)   Normal- no aphasia    Dysarthria: (0)   Normal    Extinction and inattention: (0)   No abnormality        Total Score:  1          LAB:  All pertinent labs reviewed  and interpreted.  Results for orders placed or performed during the hospital encounter of 09/02/24   Glucose by meter   Result Value Ref Range    GLUCOSE BY METER POCT 96 70 - 99 mg/dL   Basic metabolic panel   Result Value Ref Range    Sodium 139 135 - 145 mmol/L    Potassium 3.8 3.4 - 5.3 mmol/L    Chloride 103 98 - 107 mmol/L    Carbon Dioxide (CO2) 25 22 - 29 mmol/L    Anion Gap 11 7 - 15 mmol/L    Urea Nitrogen 13.8 8.0 - 23.0 mg/dL    Creatinine 0.78 0.51 - 0.95 mg/dL    GFR Estimate 81 >60 mL/min/1.73m2    Calcium 9.1 8.8 - 10.4 mg/dL    Glucose 100 (H) 70 - 99 mg/dL   CBC with platelets and differential   Result Value Ref Range    WBC Count 5.6 4.0 - 11.0 10e3/uL    RBC Count 4.36 3.80 - 5.20 10e6/uL    Hemoglobin 14.0 11.7 - 15.7 g/dL    Hematocrit 42.3 35.0 - 47.0 %    MCV 97 78 - 100 fL    MCH 32.1 26.5 - 33.0 pg    MCHC 33.1 31.5 - 36.5 g/dL    RDW 13.4 10.0 - 15.0 %    Platelet Count 207 150 - 450 10e3/uL    % Neutrophils 54 %    % Lymphocytes 36 %    % Monocytes 9 %    % Eosinophils 1 %    % Basophils 1 %    % Immature Granulocytes 0 %    NRBCs per 100 WBC 0 <1 /100    Absolute Neutrophils 3.0 1.6 - 8.3 10e3/uL    Absolute Lymphocytes 2.0 0.8 - 5.3 10e3/uL    Absolute Monocytes 0.5 0.0 - 1.3 10e3/uL    Absolute Eosinophils 0.1 0.0 - 0.7 10e3/uL    Absolute Basophils 0.0 0.0 - 0.2 10e3/uL    Absolute Immature Granulocytes 0.0 <=0.4 10e3/uL    Absolute NRBCs 0.0 10e3/uL       RADIOLOGY:  Reviewed all pertinent imaging. Please see official radiology report.  MR Brain w/o & w Contrast    (Results Pending)   MRA Brain ([place]) w Contrast    (Results Pending)   MRA Neck ([place]) w Contrast    (Results Pending)         EKG:    Reviewed and interpreted as:   Performed at: 2-Sep-2024, 12:58  Vent. rate:  71 BPM  Sinus rhythm. No ST abnormalities.    No previous ECGs available for comparison.       I have independently reviewed and interpreted the EKG(s) documented above.        ISantiago,  am serving as a scribe to document services personally performed by Dr. Radha Alfaro based on my observation and the provider's statements to me. I, Radha Alfaro MD attest that Santiago Martinez is acting in a scribe capacity, has observed my performance of the services and has documented them in accordance with my direction.       Radha Alfaro MD  09/02/24 5922

## 2024-09-02 NOTE — ED TRIAGE NOTES
Here with grandson. Pt has bp machine at home and BP was low yesterday. States 120/p. Also had right arm weakness since yesterday. Went to  and was told to come to ED for eval. Does have equal grasps and able to amb without difficulty. No pain. No n/v, sob, dizziness or visual problems. Grandson states speech is normal and pt is oriented.      Triage Assessment (Adult)       Row Name 09/02/24 1228          Triage Assessment    Airway WDL WDL

## 2024-09-02 NOTE — LETTER
[facility]  [address] [address]  Phone: [phone]  Fax: [phone]    September 4, 2024        Konrad Mcarthur  [address] [place] [address]          To whom it may concern:    RE: Konrad Mcarthur    The above-named individual was admitted at [facility] on 9/2/2024 and discharged on 9/4/2024. Josef, her son, played a significant role in her care while she was hospitalized.     Please contact me for questions or concerns.      Sincerely,      Aislinn Hoang MD

## 2024-09-02 NOTE — PHARMACY-ADMISSION MEDICATION HISTORY
Pharmacist Admission Medication History    Admission medication history is complete. The information provided in this note is only as accurate as the sources available at the time of the update.    Information Source(s): Patient, Family member, Clinic records, and CareEverywhere/SureScripts via in-person    Pertinent Information: she hasn't taken atorvastatin in one month due to not refilling as she moved and no longer wishes to use [facility] pharmacy.     Changes made to PTA medication list:  Added: None  Deleted: naproxen  Changed: None    Allergies reviewed with patient and updates made in EHR: yes    Medication History Completed By: Keith Rivero [facility] 9/2/2024 5:22 PM    PTA Med List   Medication Sig Last Dose    atenolol-chlorthalidone (TENORETIC) 100-25 MG tablet Take 1 tablet by mouth daily Past Week    atorvastatin (LIPITOR) 10 MG tablet Take 1 tablet (10 mg) by mouth daily Past Month    vitamin D3 (CHOLECALCIFEROL) 50 mcg (2000 units) tablet Take 1 tablet (50 mcg) by mouth daily Past Month

## 2024-09-02 NOTE — ED NOTES
EMERGENCY DEPARTMENT SIGN OUT NOTE        ED COURSE AND MEDICAL DECISION MAKING  Patient was signed out to me by Dr Radha Alfaro at 1515 PM    In brief, Konrad Mcarthur is a 69 year old female who initially presented with transient right hand numbness or weakness.  She is currently pending MRI with MRA and determination of disposition  4:53 PM MRI resulted with subacute stroke left frontal semiovale.  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  Symptoms consistent with subacute stroke and will make for further evaluation.  5:06 PM Case was discussed with the hospitalist and neurology.  Dr. Barrera for neurology recommended initiating low-dose aspirin 81 mg and also Plavix and will give load of 300 mg  Current findings and plan for admission discussed with the patient and her family and they are in agreement      FINAL IMPRESSION    1. Cerebrovascular accident (CVA), unspecified mechanism (H)        ED MEDS  Medications   gadobutrol (GADAVIST) injection 9 mL (9 mLs Intravenous $Given 9/2/24 2618)       LAB  Labs Ordered and Resulted from Time of ED Arrival to Time of ED Departure   BASIC METABOLIC PANEL - Abnormal       Result Value    Sodium 139      Potassium 3.8      Chloride 103      Carbon Dioxide (CO2) 25      Anion Gap 11      Urea Nitrogen 13.8      Creatinine 0.78      GFR Estimate 81      Calcium 9.1      Glucose 100 (*)    GLUCOSE BY METER - Normal    GLUCOSE BY METER POCT 96     CBC WITH PLATELETS AND DIFFERENTIAL    WBC Count 5.6      RBC Count 4.36      Hemoglobin 14.0      Hematocrit 42.3      MCV 97      MCH 32.1      MCHC 33.1      RDW 13.4      Platelet Count 207      % Neutrophils 54      % Lymphocytes 36      % Monocytes 9      % Eosinophils 1      % Basophils 1      % Immature Granulocytes 0      NRBCs per 100 WBC 0      Absolute Neutrophils 3.0      Absolute Lymphocytes 2.0      Absolute Monocytes 0.5      Absolute Eosinophils 0.1      Absolute Basophils  0.0      Absolute Immature Granulocytes 0.0      Absolute NRBCs 0.0         EKG  Sinus rhythm rate 71 bpm    RADIOLOGY    MRA Neck (Carotids) wo & w Contrast   Final Result   IMPRESSION:   HEAD MRI:    1.  Small patchy focus of acute infarction in the left posterior frontal centrum semiovale.   2.  Moderate chronic small vessel ischemic disease.   3.  Chronic microhemorrhage in the right thalamic nucleus.      HEAD MRA:    1.  Normal MRA [place].      NECK MRA:   1.  Widely patent major cervical arteries. No large vessel occlusion or stenosis.   2.  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      MRA Brain ([place]) wo Contrast   Final Result   IMPRESSION:   HEAD MRI:    1.  Small patchy focus of acute infarction in the left posterior frontal centrum semiovale.   2.  Moderate chronic small vessel ischemic disease.   3.  Chronic microhemorrhage in the right thalamic nucleus.      HEAD MRA:    1.  Normal MRA [place].      NECK MRA:   1.  Widely patent major cervical arteries. No large vessel occlusion or stenosis.   2.  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      MR Brain w/o & w Contrast   Final Result   IMPRESSION:   HEAD MRI:    1.  Small patchy focus of acute infarction in the left posterior frontal centrum semiovale.   2.  Moderate chronic small vessel ischemic disease.   3.  Chronic microhemorrhage in the right thalamic nucleus.      HEAD MRA:    1.  Normal MRA [place].      NECK MRA:   1.  Widely patent major cervical arteries. No large vessel occlusion or stenosis.   2.  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           DISCHARGE MEDS  New Prescriptions    No medications on file       DO ANDREW Huber [facility] EMERGENCY DEPARTMENT  [facility] [facility] [id number] [phone]         Luigi Lepe DO  09/02/24 1700       Luigi Lepe DO  09/02/24 1708

## 2024-09-02 NOTE — H&P
[facility]    History and Physical - Hospitalist Service       Date of Admission:  9/2/2024    Assessment & Plan    Konrad Mcarthur is a 69 year old female with PMHx of HTN and HLD who was admitted on 9/2/2024. She presented to the ED for evaluation of R arm weakness that began 1 day ago, found to have subacute stroke.     Subacute Stroke   R ICA saccular pseudoaneurysm  Presented to the ER for evaluation right arm/hand weakness that began at 11 AM yesterday  -- MR: Small patchy focus of acute infarction in the left posterior frontal centrum semiovale. Also noted 5 mm saccular pseudoaneurysm of R ICA, chronic microhemorrhage of R thalamic nucleus   -- Neurology consult  -- Given ASA and Plavix in the ED  -- Continue PTA statin  -- Stroke order set used  -- Neurochecks  -- Echo ordered   -- Telemetry   -- PT/OT/CM    Essential hypertension  -- Permissive HTN; PRN labetolol/hydralazine   -- HOLDing PTA atenolol-chlorthalidone for now   -- Close monitoring     Hyperlipidemia   -- Continue PTA statin    PreDM   -- A1C 6.1%  -- Glucose checks; monitor  -- If persistently elevated would add sliding scale  -- PCP follow-up          Diet: Regular Diet Adult  DVT Prophylaxis: Pneumatic Compression Devices  [place] Catheter: Not present  Lines: None     Cardiac Monitoring: ACTIVE order. Indication: Stroke, acute (48 hours)  Code Status: No CPR- Do NOT Intubate - discussed and confirmed with patient via [employer] phone  and family member at bedside    Clinically Significant Risk Factors Present on Admission                  # Hypertension: Noted on problem list                          Disposition Plan     Medically Ready for Discharge: Anticipated Tomorrow       The patient's care was discussed with the Attending Physician, Dr. Bismark Valero who independently met with and assessed the patient and is in agreement with the assessment and plan     Lanette Polanco PA-C  Hospitalist Service  [facility]  "[facility]  Securely message with Danii (more info)  Text page via [email or web address] Paging/Directory     ______________________________________________________________________    Chief Complaint   Right arm weakness     History is obtained from the patient. A [employer] phone  was used during this patient encounter     History of Present Illness   Konrad Mcarthur is a 69 year old female with PMHx of hypertension and hyperlipidemia who presented to the ED on 9/2/2024 for evaluation of right arm weakness.  Patient states she was out walking yesterday around 11 AM when she began to experience weakness in her right arm and hand. Describes symptoms as \"struggling to make a fist.\"  Denies paresthesias. Went to her PCP today for evaluation of symptoms who instructed patient to present to the ER.  Describes symptoms as improving but not quite yet at baseline.  Denies any other symptoms.  No vision changes, headache, chest pain, palpitations.  Family member at bedside denies any slurred and noticing any changes in speech.  Did state they noticed a slight facial droop today while at the PCP office but states this is resolved and is no longer noticeable.    Past Medical History    Past Medical History:   Diagnosis Date    Hypertension        Past Surgical History   Past Surgical History:   Procedure Laterality Date    HYSTERECTOMY      [place], around 2000    HYSTERECTOMY         Prior to Admission Medications   Prior to Admission Medications   Prescriptions Last Dose Informant Patient Reported? Taking?   atenolol-chlorthalidone (TENORETIC) 100-25 MG tablet Past Week  No Yes   Sig: Take 1 tablet by mouth daily   atorvastatin (LIPITOR) 10 MG tablet Past Month  No Yes   Sig: Take 1 tablet (10 mg) by mouth daily   vitamin D3 (CHOLECALCIFEROL) 50 mcg (2000 units) tablet Past Month  No Yes   Sig: Take 1 tablet (50 mcg) by mouth daily      Facility-Administered Medications: None           Physical Exam   Vital Signs: Temp: " 98  F (36.7  C) Temp src: Oral BP: (!) 175/94 Pulse: 61   Resp: 25 SpO2: 95 % O2 Device: None (Room air)    Weight: 204 lbs 11.2 oz    Constitutional: awake, alert, no apparent distress, and appears stated age  Eyes: Lids and lashes normal, extra ocular muscles intact, sclera clear, conjunctiva normal, PERRLA  Respiratory: No increased work of breathing, no accessory muscle use, clear to auscultation bilaterally, no crackles or wheezing  Cardiovascular: Regular rate and rhythm, normal S1 and S2, no S3 or S4, and no murmur noted  Skin: Normal skin color, texture, turgor. No rashes and no jaundice  Musculoskeletal: no lower extremity pitting edema present. 2+ DP pulses  Neurologic: CN II through XII intact.  Bilateral  strength 5 out of 5.  NVI  Neuropsychiatric: Appropriate mood, affect and eye contact. Cooperative.    Medical Decision Making             Data     I have personally reviewed the following data over the past 24 hrs:    5.6  \   14.0   / 207     139 103 13.8 /  100 (H)   3.8 25 0.78 \     Trop: 14 BNP: N/A     TSH: N/A T4: N/A A1C: 6.1 (H)       Imaging results reviewed over the past 24 hrs:   Recent Results (from the past 24 hour(s))   MR Brain w/o & w Contrast    Narrative    EXAM: MR BRAIN W/O and W CONTRAST, MRA NECK (CAROTIDS) W/O and W CONTRAST, MRA BRAIN ([place]) W/O CONTRAST  LOCATION: [facility]  DATE: 9/2/2024    INDICATION: Right  weak since yesterday 9/1/2024 at 11am, right leg drift on examination, TIA/Stroke  COMPARISON: None.  CONTRAST: 9 mL Gadavist  TECHNIQUE:   1) Routine multiplanar multisequence head MRI without and with intravenous contrast.  2) 3D time-of-flight head MRA without intravenous contrast.  3) Neck MRA without and with IV contrast. Stenosis measurements made according to NASCET criteria unless otherwise specified.    FINDINGS:  HEAD MRI:  INTRACRANIAL CONTENTS: Approximately 8 x 6 x 6 mm patchy focus of acute infarction in the  posterior left frontal centrum semiovale. Mild to moderate chronic small vessel ischemic changes throughout the cerebral hemispheric white matter bilaterally.   Multiple chronic bilateral basal ganglia and thalamic lacunar infarcts. No acute intracranial hemorrhage or abnormal extra axial fluid collection. No mass effect, midline shift or herniation. The ventricles are not enlarged. Normal position of the   cerebellar tonsils. No abnormal foci of intracranial enhancement. Chronic microhemorrhage in the right thalamic nucleus.    SELLA: No abnormality accounting for technique.    OSSEOUS STRUCTURES/SOFT TISSUES: Normal marrow signal. The major intracranial vascular flow voids are maintained.     ORBITS: No abnormality accounting for technique.     SINUSES/MASTOIDS: Mild mucosal thickening scattered about the paranasal sinuses. No middle ear or mastoid effusion.     HEAD MRA:   ANTERIOR CIRCULATION: No stenosis/occlusion, aneurysm, or high flow vascular malformation. Fetal origin of the right posterior cerebral artery from the anterior circulation. Normal variant mildly hypoplastic left A1 segment.    POSTERIOR CIRCULATION: No stenosis/occlusion, aneurysm, or high flow vascular malformation. Balanced vertebral arteries supply a normal basilar artery.     NECK MRA:   RIGHT CAROTID: Dorsally oriented saccular pseudoaneurysm arising from the subpetrosal segment of the right internal carotid artery, which measures 8 mm at the neck and 5 mm from dome to base. No intramural hematoma. No measurable stenosis.    LEFT CAROTID: No measurable stenosis or dissection.    VERTEBRAL ARTERIES: No focal stenosis or dissection. Balanced vertebral arteries.    AORTIC ARCH: Classic aortic arch anatomy with no significant stenosis at the origin of the great vessels.      Impression    IMPRESSION:  HEAD MRI:   1.  Small patchy focus of acute infarction in the left posterior frontal centrum semiovale.  2.  Moderate chronic small vessel  ischemic disease.  3.  Chronic microhemorrhage in the right thalamic nucleus.    HEAD MRA:   1.  Normal MRA [place].    NECK MRA:  1.  Widely patent major cervical arteries. No large vessel occlusion or stenosis.  2.  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   MRA Brain ([place]) wo Contrast    Narrative    EXAM: MR BRAIN W/O and W CONTRAST, MRA NECK (CAROTIDS) W/O and W CONTRAST, MRA BRAIN ([place]) W/O CONTRAST  LOCATION: [facility]  DATE: 9/2/2024    INDICATION: Right  weak since yesterday 9/1/2024 at 11am, right leg drift on examination, TIA/Stroke  COMPARISON: None.  CONTRAST: 9 mL Gadavist  TECHNIQUE:   1) Routine multiplanar multisequence head MRI without and with intravenous contrast.  2) 3D time-of-flight head MRA without intravenous contrast.  3) Neck MRA without and with IV contrast. Stenosis measurements made according to NASCET criteria unless otherwise specified.    FINDINGS:  HEAD MRI:  INTRACRANIAL CONTENTS: Approximately 8 x 6 x 6 mm patchy focus of acute infarction in the posterior left frontal centrum semiovale. Mild to moderate chronic small vessel ischemic changes throughout the cerebral hemispheric white matter bilaterally.   Multiple chronic bilateral basal ganglia and thalamic lacunar infarcts. No acute intracranial hemorrhage or abnormal extra axial fluid collection. No mass effect, midline shift or herniation. The ventricles are not enlarged. Normal position of the   cerebellar tonsils. No abnormal foci of intracranial enhancement. Chronic microhemorrhage in the right thalamic nucleus.    SELLA: No abnormality accounting for technique.    OSSEOUS STRUCTURES/SOFT TISSUES: Normal marrow signal. The major intracranial vascular flow voids are maintained.     ORBITS: No abnormality accounting for technique.      SINUSES/MASTOIDS: Mild mucosal thickening scattered about the paranasal sinuses. No middle ear or mastoid effusion.     HEAD MRA:   ANTERIOR CIRCULATION: No stenosis/occlusion, aneurysm, or high flow vascular malformation. Fetal origin of the right posterior cerebral artery from the anterior circulation. Normal variant mildly hypoplastic left A1 segment.    POSTERIOR CIRCULATION: No stenosis/occlusion, aneurysm, or high flow vascular malformation. Balanced vertebral arteries supply a normal basilar artery.     NECK MRA:   RIGHT CAROTID: Dorsally oriented saccular pseudoaneurysm arising from the subpetrosal segment of the right internal carotid artery, which measures 8 mm at the neck and 5 mm from dome to base. No intramural hematoma. No measurable stenosis.    LEFT CAROTID: No measurable stenosis or dissection.    VERTEBRAL ARTERIES: No focal stenosis or dissection. Balanced vertebral arteries.    AORTIC ARCH: Classic aortic arch anatomy with no significant stenosis at the origin of the great vessels.      Impression    IMPRESSION:  HEAD MRI:   1.  Small patchy focus of acute infarction in the left posterior frontal centrum semiovale.  2.  Moderate chronic small vessel ischemic disease.  3.  Chronic microhemorrhage in the right thalamic nucleus.    HEAD MRA:   1.  Normal MRA [place].    NECK MRA:  1.  Widely patent major cervical arteries. No large vessel occlusion or stenosis.  2.  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   MRA Neck (Carotids) wo & w Contrast    Narrative    EXAM: MR BRAIN W/O and W CONTRAST, MRA NECK (CAROTIDS) W/O and W CONTRAST, MRA BRAIN ([place]) W/O CONTRAST  LOCATION: [facility]  DATE: 9/2/2024    INDICATION: Right  weak since yesterday 9/1/2024 at 11am, right leg drift on examination, TIA/Stroke  COMPARISON:  None.  CONTRAST: 9 mL Gadavist  TECHNIQUE:   1) Routine multiplanar multisequence head MRI without and with intravenous contrast.  2) 3D time-of-flight head MRA without intravenous contrast.  3) Neck MRA without and with IV contrast. Stenosis measurements made according to NASCET criteria unless otherwise specified.    FINDINGS:  HEAD MRI:  INTRACRANIAL CONTENTS: Approximately 8 x 6 x 6 mm patchy focus of acute infarction in the posterior left frontal centrum semiovale. Mild to moderate chronic small vessel ischemic changes throughout the cerebral hemispheric white matter bilaterally.   Multiple chronic bilateral basal ganglia and thalamic lacunar infarcts. No acute intracranial hemorrhage or abnormal extra axial fluid collection. No mass effect, midline shift or herniation. The ventricles are not enlarged. Normal position of the   cerebellar tonsils. No abnormal foci of intracranial enhancement. Chronic microhemorrhage in the right thalamic nucleus.    SELLA: No abnormality accounting for technique.    OSSEOUS STRUCTURES/SOFT TISSUES: Normal marrow signal. The major intracranial vascular flow voids are maintained.     ORBITS: No abnormality accounting for technique.     SINUSES/MASTOIDS: Mild mucosal thickening scattered about the paranasal sinuses. No middle ear or mastoid effusion.     HEAD MRA:   ANTERIOR CIRCULATION: No stenosis/occlusion, aneurysm, or high flow vascular malformation. Fetal origin of the right posterior cerebral artery from the anterior circulation. Normal variant mildly hypoplastic left A1 segment.    POSTERIOR CIRCULATION: No stenosis/occlusion, aneurysm, or high flow vascular malformation. Balanced vertebral arteries supply a normal basilar artery.     NECK MRA:   RIGHT CAROTID: Dorsally oriented saccular pseudoaneurysm arising from the subpetrosal segment of the right internal carotid artery, which measures 8 mm at the neck and 5 mm from dome to base. No intramural hematoma. No measurable  stenosis.    LEFT CAROTID: No measurable stenosis or dissection.    VERTEBRAL ARTERIES: No focal stenosis or dissection. Balanced vertebral arteries.    AORTIC ARCH: Classic aortic arch anatomy with no significant stenosis at the origin of the great vessels.      Impression    IMPRESSION:  HEAD MRI:   1.  Small patchy focus of acute infarction in the left posterior frontal centrum semiovale.  2.  Moderate chronic small vessel ischemic disease.  3.  Chronic microhemorrhage in the right thalamic nucleus.    HEAD MRA:   1.  Normal MRA [place].    NECK MRA:  1.  Widely patent major cervical arteries. No large vessel occlusion or stenosis.  2.  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

## 2024-09-02 NOTE — ED NOTES
[facility] ED Handoff Report    ED Chief Complaint:     ED Diagnosis:  (I63.9) Cerebrovascular accident (CVA), unspecified mechanism (H)  Comment:   Plan:        PMH:    Past Medical History:   Diagnosis Date    Hypertension         Code Status:  No Order     Falls Risk: Yes Band: Applied    Current Living Situation/Residence: lives with their son or daughter and lives in a house     Elimination Status: Continent: Yes     Activity Level: SBA    Patients Preferred Language:  English     Needed: Yes    Vital Signs:  BP (!) 175/94   Pulse 61   Temp 98  F (36.7  C) (Oral)   Resp 25   Wt 92.9 kg (204 lb 11.2 oz)   SpO2 95%   BMI 46.72 kg/m       Cardiac Rhythm: NSR      Pain Score: 0/10    Is the Patient Confused:  No    Last Food or Drink: 09/02/24 at     Focused Assessment:  Here with grandson. Pt has bp machine at home and BP was low yesterday. States 120/p. Also had right arm weakness since yesterday. Went to  and was told to come to ED for eval. Does have equal grasps and able to amb without difficulty. No pain. No n/v, sob, dizziness or visual problems. Grandson states speech is normal and pt is oriented     A/o up with sba, weakened right hand grasp and weakness per pt.     Tests Performed: Done: Labs and Imaging    Treatments Provided:      Family Dynamics/Concerns: No    Family Updated On Visitor Policy: Yes    Plan of Care Communicated to Family: Yes    Who Was Updated about Plan of Care: son    Belongings Checklist Done and Signed by Patient: Yes    Belongings Sent with Patient: clothes/shoes    Medications sent with patient: NA    Covid: asymptomatic , na    Additional Information:     Kavitha Ellison RN   9/2/2024 5:46 PM

## 2024-09-02 NOTE — PATIENT INSTRUCTIONS
[facility] for further evaluation of hypertensive urgency with a blood pressure of 157/107 patient with onset of right arm weakness yesterday -2 out of 5  strength and noted left facial droop in urgent care  Patient with history of hypertension stopped her BP meds 4 days ago because her systolic blood pressures were good with SP in 120s

## 2024-09-02 NOTE — PROGRESS NOTES
ASSESSMENT/PLAN:      ICD-10-CM    1. Hypertensive urgency  I16.0     stopped bp meds 4 days ago because the bp was better ( sbp 120's) now /107, acute onset weakness rt arm yesterday, left sided facial drooping noted today      2. Right arm weakness  R29.898       3. Facial droop  R29.810     on left, ? new onset          Patient Instructions   [facility] ER for further evaluation of hypertensive urgency with a blood pressure of 157/107 patient with onset of right arm weakness yesterday -2 out of 5  strength and noted left facial droop in urgent care  Patient with history of hypertension stopped her BP meds 4 days ago because her systolic blood pressures were good with SP in 120s        Reviewed medication instructions and side effects. Follow up if experiences side effects.     I reviewed supportive care, otc meds to use if needed, expected course, and signs of concern.  Follow up as needed or if she does not improve within  1-2 days or if worsens in any way.  Reviewed red flag symptoms and is to go to the ER if experiences any of these.     The use of Dragon/dMetrics dictation services may have been used to construct the content in this note; any grammatical or spelling errors are non-intentional. Please contact the author of this note directly if you are in need of any clarification.                   Patient presents with:  Extremity Weakness: Right arm weakness, started yesterday  Patient stopped her high blood pressure medications 4 days ago due to her blood pressure was better with SBP 120s       Subjective     Konrad Mcarthur is a 69 year old female who presents to clinic today for the following health issues:    HPI   Patient with sudden onset of right arm weakness yesterday-difficulty trying to  or hold objects with her hand, no injury to arm no pain   patient with history of hypertension stopped her BP meds 4 days ago because her systolic blood pressures were good with SP in 120s  No chest  pain, no shortness of breath, no headache or changes in vision  Son with patient and assisted in interpreting for patient and confirmed history as noted      Past Medical History:   Diagnosis Date    Hypertension      Social History     Tobacco Use    Smoking status: Never    Smokeless tobacco: Never   Substance Use Topics    Alcohol use: Not on file       No current outpatient medications on file.     Allergies   Allergen Reactions    Influenza Virus Vaccine      Cough unstopped per pt when first received it in 2009, ever since then, don't get flu shot.             ROS are negative, except as otherwise noted HPI      Objective    BP (!) 157/107   Pulse 87   Resp 18   SpO2 98%   There is no height or weight on file to calculate BMI.  Physical Exam   GENERAL: alert and no distress  EYES: Eyes grossly normal to inspection, PERRL and conjunctivae and sclerae normal  MS: no gross musculoskeletal defects noted, no edema  NEURO: Normal strength in all extremities except right hand 2/5  strength, mild facial droop noted on the left with drooping of of left upper eyelid compared to right and unable to elevate left eyebrow compared to right, mild asymmetric smile with drooping of left side of mouth.  Unsteady gait-son confirmed that drooping of left eyelid is not chronic or has been present in the past and confirmed with patient       Diagnostic Test Results:  Labs reviewed in Epic  none

## 2024-09-03 ENCOUNTER — APPOINTMENT (OUTPATIENT)
Dept: CARDIOLOGY | Facility: HOSPITAL | Age: 69
DRG: 065 | End: 2024-09-03
Payer: COMMERCIAL

## 2024-09-03 ENCOUNTER — APPOINTMENT (OUTPATIENT)
Dept: PHYSICAL THERAPY | Facility: HOSPITAL | Age: 69
DRG: 065 | End: 2024-09-03
Payer: COMMERCIAL

## 2024-09-03 PROBLEM — E66.01 MORBID OBESITY (H): Status: ACTIVE | Noted: 2021-10-08

## 2024-09-03 PROBLEM — R73.03 PREDIABETES: Status: ACTIVE | Noted: 2024-09-03

## 2024-09-03 LAB
ANION GAP SERPL CALCULATED.3IONS-SCNC: 12 MMOL/L (ref 7–15)
BUN SERPL-MCNC: 12.5 MG/DL (ref 8–23)
CALCIUM SERPL-MCNC: 9.3 MG/DL (ref 8.8–10.4)
CHLORIDE SERPL-SCNC: 103 MMOL/L (ref 98–107)
CREAT SERPL-MCNC: 0.74 MG/DL (ref 0.51–0.95)
EGFRCR SERPLBLD CKD-EPI 2021: 86 ML/MIN/1.73M2
ERYTHROCYTE [DISTWIDTH] IN BLOOD BY AUTOMATED COUNT: 13.3 % (ref 10–15)
GLUCOSE BLDC GLUCOMTR-MCNC: 121 MG/DL (ref 70–99)
GLUCOSE BLDC GLUCOMTR-MCNC: 159 MG/DL (ref 70–99)
GLUCOSE BLDC GLUCOMTR-MCNC: 94 MG/DL (ref 70–99)
GLUCOSE SERPL-MCNC: 125 MG/DL (ref 70–99)
HCO3 SERPL-SCNC: 24 MMOL/L (ref 22–29)
HCT VFR BLD AUTO: 43 % (ref 35–47)
HGB BLD-MCNC: 14.5 G/DL (ref 11.7–15.7)
LVEF ECHO: NORMAL
MCH RBC QN AUTO: 32.2 PG (ref 26.5–33)
MCHC RBC AUTO-ENTMCNC: 33.7 G/DL (ref 31.5–36.5)
MCV RBC AUTO: 96 FL (ref 78–100)
PLATELET # BLD AUTO: 204 10E3/UL (ref 150–450)
POTASSIUM SERPL-SCNC: 3.8 MMOL/L (ref 3.4–5.3)
RBC # BLD AUTO: 4.5 10E6/UL (ref 3.8–5.2)
SODIUM SERPL-SCNC: 139 MMOL/L (ref 135–145)
WBC # BLD AUTO: 6.8 10E3/UL (ref 4–11)

## 2024-09-03 PROCEDURE — 82565 ASSAY OF CREATININE: CPT

## 2024-09-03 PROCEDURE — 99232 SBSQ HOSP IP/OBS MODERATE 35: CPT | Performed by: HOSPITALIST

## 2024-09-03 PROCEDURE — 36415 COLL VENOUS BLD VENIPUNCTURE: CPT

## 2024-09-03 PROCEDURE — 250N000012 HC RX MED GY IP 250 OP 636 PS 637: Performed by: HOSPITALIST

## 2024-09-03 PROCEDURE — 120N000001 HC R&B MED SURG/OB

## 2024-09-03 PROCEDURE — 85014 HEMATOCRIT: CPT

## 2024-09-03 PROCEDURE — 97116 GAIT TRAINING THERAPY: CPT | Mod: GP

## 2024-09-03 PROCEDURE — 97161 PT EVAL LOW COMPLEX 20 MIN: CPT | Mod: GP

## 2024-09-03 PROCEDURE — 255N000002 HC RX 255 OP 636

## 2024-09-03 PROCEDURE — 999N000226 HC STATISTIC SLP IP EVAL DEFER

## 2024-09-03 PROCEDURE — C8929 TTE W OR WO FOL WCON,DOPPLER: HCPCS

## 2024-09-03 PROCEDURE — 93306 TTE W/DOPPLER COMPLETE: CPT | Mod: 26 | Performed by: INTERNAL MEDICINE

## 2024-09-03 PROCEDURE — 250N000013 HC RX MED GY IP 250 OP 250 PS 637

## 2024-09-03 PROCEDURE — 82374 ASSAY BLOOD CARBON DIOXIDE: CPT

## 2024-09-03 PROCEDURE — 99223 1ST HOSP IP/OBS HIGH 75: CPT | Performed by: PSYCHIATRY & NEUROLOGY

## 2024-09-03 RX ORDER — DEXTROSE MONOHYDRATE 25 G/50ML
25-50 INJECTION, SOLUTION INTRAVENOUS
Status: DISCONTINUED | OUTPATIENT
Start: 2024-09-03 | End: 2024-09-04 | Stop reason: HOSPADM

## 2024-09-03 RX ORDER — ATENOLOL 25 MG/1
50 TABLET ORAL DAILY
Status: DISCONTINUED | OUTPATIENT
Start: 2024-09-04 | End: 2024-09-04 | Stop reason: HOSPADM

## 2024-09-03 RX ORDER — NICOTINE POLACRILEX 4 MG
15-30 LOZENGE BUCCAL
Status: DISCONTINUED | OUTPATIENT
Start: 2024-09-03 | End: 2024-09-04 | Stop reason: HOSPADM

## 2024-09-03 RX ORDER — ATORVASTATIN CALCIUM 10 MG/1
20 TABLET, FILM COATED ORAL DAILY
Status: DISCONTINUED | OUTPATIENT
Start: 2024-09-04 | End: 2024-09-04 | Stop reason: HOSPADM

## 2024-09-03 RX ADMIN — PERFLUTREN 2 ML: 6.52 INJECTION, SUSPENSION INTRAVENOUS at 11:05

## 2024-09-03 RX ADMIN — INSULIN ASPART 1 UNITS: 100 INJECTION, SOLUTION INTRAVENOUS; SUBCUTANEOUS at 13:13

## 2024-09-03 RX ADMIN — CLOPIDOGREL BISULFATE 75 MG: 75 TABLET ORAL at 08:35

## 2024-09-03 RX ADMIN — ASPIRIN 81 MG: 81 TABLET, COATED ORAL at 08:34

## 2024-09-03 RX ADMIN — ATORVASTATIN CALCIUM 10 MG: 10 TABLET, FILM COATED ORAL at 08:34

## 2024-09-03 ASSESSMENT — ACTIVITIES OF DAILY LIVING (ADL)
ADLS_ACUITY_SCORE: 22
DEPENDENT_IADLS:: INDEPENDENT;TRANSPORTATION
ADLS_ACUITY_SCORE: 22

## 2024-09-03 NOTE — PLAN OF CARE
"  Problem: Adult Inpatient Plan of Care  Goal: Optimal Comfort and Wellbeing  Outcome: Progressing     Problem: Risk for Delirium  Goal: Optimal Coping  Outcome: Progressing  Goal: Improved Behavioral Control  Outcome: Progressing  Intervention: Minimize Safety Risk  Recent Flowsheet Documentation  Taken 9/3/2024 0000 by David Almodovar RN  Communication Enhancement Strategies:   call light answered in person   extra time allowed for response    utilized   nonverbal strategies used  Enhanced Safety Measures:   room near unit station   family to remain at bedside  Goal: Improved Attention and Thought Clarity  Outcome: Progressing  Intervention: Maximize Cognitive Function  Recent Flowsheet Documentation  Taken 9/3/2024 0000 by David Almodovar RN  Sensory Stimulation Regulation: care clustered  Reorientation Measures:   clock in view   familiar social contact encouraged   reorientation provided  Goal: Improved Sleep  Outcome: Progressing  Intervention: Promote Sleep  Recent Flowsheet Documentation  Taken 9/3/2024 0000 by David Almodovar RN  Sleep/Rest Enhancement:   regular sleep/rest pattern promoted   noise level reduced   family presence promoted   Goal Outcome Evaluation:       BP (!) 186/85 (BP Location: Right arm)   Pulse 71   Temp 98  F (36.7  C) (Axillary)   Resp 20   Ht 1.473 m (4' 10\")   Wt 92.9 kg (204 lb 12.9 oz)   SpO2 95%   BMI 42.80 kg/m      BP was high but within parameters. On room air. Denied pain, n/v. Tele-NSR. Son in room.  utilized Hmong speaking. SCDs on during shift.                "

## 2024-09-03 NOTE — PLAN OF CARE
Physical Therapy Discharge Summary    Reason for therapy discharge:    All goals and outcomes met, no further needs identified. Pt to continue walking halls until discharge home     Progress towards therapy goal(s). See goals on Care Plan in Psychiatric electronic health record for goal details.  Goals met    Therapy recommendation(s):    No further therapy is recommended.

## 2024-09-03 NOTE — PROGRESS NOTES
"   09/03/24 1000   Appointment Info   Signing Clinician's Name / Credentials (PT) Adriana Yeager,PT       Present yes   Language Hmong-vocera   Living Environment   People in Home grandchild(dallas);child(dallas), adult   Current Living Arrangements house   Home Accessibility stairs to enter home;stairs within home   Number of Stairs, Main Entrance 2   Stair Railings, Main Entrance other (see comments)  (1 railing)   Number of Stairs, Within Home, Primary greater than 10 stairs   Stair Railings, Within Home, Primary   (1 railing)   Transportation Anticipated family or friend will provide   Self-Care   Equipment Currently Used at Home none   Fall history within last six months no   Activity/Exercise/Self-Care Comment I ADLs, DIL IADLS, drives   General Information   Onset of Illness/Injury or Date of Surgery 09/02/24   Referring Physician Dr. Juana Sanchez   Patient/Family Therapy Goals Statement (PT) return home   Pertinent History of Current Problem (include personal factors and/or comorbidities that impact the POC) Konrad Mcarthur is a 69 year old female with PMHx of hypertension and hyperlipidemia who presented to the ED on 9/2/2024 for evaluation of right arm weakness.  Patient states she was out walking yesterday around 11 AM when she began to experience weakness in her right arm and hand. Describes symptoms as \"struggling to make a fist.\"  Denies paresthesias. Went to her PCP today for evaluation of symptoms who instructed patient to present to the ER.  Describes symptoms as improving but not quite yet at baseline.  Denies any other symptoms.  No vision changes, headache, chest pain, palpitations.  Family member at bedside denies any slurred and noticing any changes in speech.  Did state they noticed a slight facial droop today while at the PCP office but states this is resolved and is no longer noticeable.   General Observations pt in bed, family in room, pt agreeable to PT   Cognition "   Affect/Mental Status (Cognition) WFL   Follows Commands (Cognition) follows multi-step commands   Pain Assessment   Patient Currently in Pain No   Range of Motion (ROM)   ROM Comment BLE WFL   Strength (Manual Muscle Testing)   Strength Comments BLE WFL   Bed Mobility   Comment, (Bed Mobility) supine<>sit [place]   Transfers   Comment, (Transfers) sit<>stand SBA no AD, pt denies dizziness, pain or weakness   Gait/Stairs (Locomotion)   [place] Level (Gait) contact guard;supervision   Assistive Device (Gait) other (see comments)  (none)   Distance in Feet (Gait) 50   Pattern (Gait) swing-through   Negotiation (Stairs) stairs independence;handrail location;number of steps;ascending technique;descending technique   [place] Level (Stairs) modified independence   Handrail Location (Stairs) left side (ascending);left side (descending)   Number of Steps (Stairs) 13  (up/down)   Ascending Technique (Stairs) step-over-step;step-to-step   Descending Technique (Stairs) step-over-step;step-to-step   Comment, (Gait/Stairs) pt feels mobility is normal   Balance   Balance Comments no LOB or path deviation   Clinical Impression   Criteria for Skilled Therapeutic Intervention Yes, treatment indicated   PT Diagnosis (PT) decreased functional mobility   Influenced by the following impairments fatigue   Functional limitations due to impairments gait   Clinical Presentation (PT Evaluation Complexity) stable   Clinical Presentation Rationale presents as medically diagnosed   Clinical Decision Making (Complexity) low complexity   Planned Therapy Interventions (PT) gait training   Risk & Benefits of therapy have been explained evaluation/treatment results reviewed   PT Total Evaluation Time   PT Eval, Low Complexity Minutes (09590) 15   Physical Therapy Goals   PT Frequency One time eval and treatment only   PT Predicted Duration/Target Date for Goal Attainment 09/03/24   PT Goals Gait   PT: Gait Independent;Greater than 200  feet;Goal Met  (no AD)   Interventions   Interventions Quick Adds Gait Training   Gait Training   Gait Training Minutes (06849) 10   Symptoms Noted During/After Treatment (Gait Training) fatigue   Treatment Detail/Skilled Intervention no LOB, no path deviation   Distance in Feet 220'   [place] Level (Gait Training) independent   Assistive Device (Gait Training) other (see comments)  (none)   Pattern Analysis (Gait Training) swing-through gait   PT Discharge Planning   PT Plan pt able to demostrate safe bed mob, transfers, gait, pt to continue ambulating halls while in the hospital   PT Discharge Recommendation (DC Rec) home   PT Rationale for DC Rec pt I with bed mob,transfers and gait/stairs   PT Brief overview of current status pt I with bed mob,transfers and gait/stairs   Total Session Time   Timed Code Treatment Minutes 10   Total Session Time (sum of timed and untimed services) 25

## 2024-09-03 NOTE — PLAN OF CARE
OT Deferral Note    Occupational Therapy: Orders received. Chart reviewed and discussed with care team.? Occupational Therapy not indicated due to: OT spent time w/ pt bedside, explaining the role of OT in this setting. Pt and pt's family present and interpreting, Pt declining OT evaluation today, stating she has no concerns relevant to ADLs, and has assist from family w/ ADLs/IADLs at home. OT providing brief education on continuing to functionally use RUE, administering sponge for hand exercises.? Defer discharge recommendations to PT and medical team.? Will complete orders.     RODOLFO Tidwell/L. 9/3/2024, 12:07 PM

## 2024-09-03 NOTE — PLAN OF CARE
Goal Outcome Evaluation:  Alert and oriented x 4. Hmong speaking. Interpretor line used by [employer]. Pt also speaks some English. Ambulate in the room independently. NIH = 0. Family in the room all the time. Ate food from home. Denied pain.    Salma Peña RN

## 2024-09-03 NOTE — PHARMACY-CONSULT NOTE
Pharmacy Consult to evaluate for medication related stroke core measures    Konrad Mcarthur, 69 year old female admitted for CVA  on 9/2/2024.    Thrombolytic was not given because of Time from onset contraindications    VTE Prophylaxis SCDs /PCDs placed on 9/2, as appropriate prior to end of hospital day 2.    Antithrombotic: aspirin and clopidogrel started on 9/2, as appropriate by end of hospital day 2. Continue antithrombotic therapy on discharge to meet quality measures, unless contraindicated.    Anticoagulation if history of A-fib/flutter: Patient does not have history of A-fib/flutter - anticoagulation not required for medication related stroke core measures.     LDL Cholesterol Calculated   Date Value Ref Range Status   09/02/2024 91 <=100 mg/dL Final   02/19/2020 118 <=129 mg/dL Final       Patient's home statin, Lipitor (atorvastatin) restarted; continue statin on discharge to meet quality measures, unless contraindicated.     Recommendations: None at this time    Thank you for the consult.    Archana Fields [facility] 9/3/2024 8:24 AM

## 2024-09-03 NOTE — CONSULTS
NEUROLOGY CONSULTATION NOTE     Konrad Mcarthur,  1955, MRN [id number] Date: 9/3/2024     [facility]   Code status:  No CPR- Do NOT Intubate   PCP: Taylor Wilkins, [phone]      ASSESSMENT & PLAN   Diagnosis code: CVA    #Subacute stroke in left posterior frontal centrum semiovale  #Rt ICA pseudoaneurysm    Aspirin + Plavix for 21 days followed by ASA monotherapy (since not on ASA PTA)  Continue neuro checks, telemetry  Ok to start normalizing BP  LDL 91. Resume home statin  HbA1c: 6.1%. Glucose management per Hospitalist    Echo is pending  Physical therapy/OT  Stroke education  Neurology follow along for test results  Patient will need to follow-up in neurology clinic in 2-3 months as well       Chief Complaint   Patient presents with    One-sided Weakness        HISTORY OF PRESENT ILLNESS     We have been requested by No ref. provider found to evaluate Konrad Mcarthur who is a 69 year old female for stroke. She presented on 24 with one day history of Right hand/arm weakness. Stroke team was initially consulted. Additional history of HTN and HLD. Not on ASA PTA. Has not been taking previously prescribed atorvastatin for one month.    Konrad tells me with the help of her son that her hand is feeling better but not completely back to normal.  No weakness in the legs and no visual changes.     PAST MEDICAL & SURGICAL HISTORY     Medical History  Past Medical History:   Diagnosis Date    Hypertension      Surgical History  Past Surgical History:   Procedure Laterality Date    HYSTERECTOMY      [place], around     HYSTERECTOMY          SOCIAL HISTORY     Social History      FAMILY HISTORY     Reviewed, and family history includes Diabetes in her sister.     ALLERGIES     Allergies   Allergen Reactions    Influenza Virus Vaccine      Cough unstopped per pt when first received it in , ever since then, don't get flu shot.        REVIEW OF SYSTEMS     Pertinent items are noted in HPI.     HOME &  HOSPITAL MEDICATIONS     Prior to Admission Medications  Medications Prior to Admission   Medication Sig Dispense Refill Last Dose    atenolol-chlorthalidone (TENORETIC) 100-25 MG tablet Take 1 tablet by mouth daily 90 tablet 4 Past Week    atorvastatin (LIPITOR) 10 MG tablet Take 1 tablet (10 mg) by mouth daily 90 tablet 4 Past Month    vitamin D3 (CHOLECALCIFEROL) 50 mcg (2000 units) tablet Take 1 tablet (50 mcg) by mouth daily 90 tablet 1 Past Month       Hospital Medications  Current Facility-Administered Medications   Medication Dose Route Frequency Provider Last Rate Last Admin    aspirin EC tablet 81 mg  81 mg Oral Daily Lanette Polanco PA-C        Or    aspirin (ASA) chewable tablet 81 mg  81 mg Oral or NG Tube Daily Lanette Polanco PA-C        [Held by provider] atenolol-chlorthalidone (TENORETIC) 100-25 mg combo dose   Oral Daily Lanette Polanco PA-C        atorvastatin (LIPITOR) tablet 10 mg  10 mg Oral Daily Lanette Polanco PA-C   10 mg at 09/02/24 2009    clopidogrel (PLAVIX) tablet 75 mg  75 mg Oral or NG Tube Daily Lanette Polanco PA-C        sodium chloride (PF) 0.9% PF flush 3 mL  3 mL Intracatheter Q8H Lanette Polanco PA-C   3 mL at 09/03/24 0217        PHYSICAL EXAM     Vital signs  Temp:  [97.6  F (36.4  C)-98.5  F (36.9  C)] 98  F (36.7  C)  Pulse:  [61-87] 81  Resp:  [17-25] 17  BP: (157-211)/() 176/94  SpO2:  [93 %-98 %] 97 %    Weight:   [unfilled]    General Physical Exam: Patient is alert and oriented x 3. Vital signs were reviewed and are documented in EMR.  Neurological Exam:  Alert, attentive, EOM full.  No definite weakness in the hands but she does report decreased sensation in the right hand compared to the left.  Spontaneous movement of legs is normal.     DIAGNOSTIC STUDIES     Pertinent Radiology   Radiology Results: Personally reviewed image/s    MRI/MRA  IMPRESSION:  HEAD MRI:   1.  Small patchy focus of acute infarction in the left posterior frontal centrum semiovale.  2.  Moderate chronic  small vessel ischemic disease.  3.  Chronic microhemorrhage in the right thalamic nucleus.     HEAD MRA:   1.  Normal MRA [place].     NECK MRA:  1.  Widely patent major cervical arteries. No large vessel occlusion or stenosis.  2.  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    Pertinent Labs   Lab Results: personally reviewed.   Recent Results (from the past 24 hour(s))   Glucose by meter    Collection Time: 09/02/24 12:28 PM   Result Value Ref Range    GLUCOSE BY METER POCT 96 70 - 99 mg/dL   Basic metabolic panel    Collection Time: 09/02/24  1:44 PM   Result Value Ref Range    Sodium 139 135 - 145 mmol/L    Potassium 3.8 3.4 - 5.3 mmol/L    Chloride 103 98 - 107 mmol/L    Carbon Dioxide (CO2) 25 22 - 29 mmol/L    Anion Gap 11 7 - 15 mmol/L    Urea Nitrogen 13.8 8.0 - 23.0 mg/dL    Creatinine 0.78 0.51 - 0.95 mg/dL    GFR Estimate 81 >60 mL/min/1.73m2    Calcium 9.1 8.8 - 10.4 mg/dL    Glucose 100 (H) 70 - 99 mg/dL   CBC with platelets and differential    Collection Time: 09/02/24  1:44 PM   Result Value Ref Range    WBC Count 5.6 4.0 - 11.0 10e3/uL    RBC Count 4.36 3.80 - 5.20 10e6/uL    Hemoglobin 14.0 11.7 - 15.7 g/dL    Hematocrit 42.3 35.0 - 47.0 %    MCV 97 78 - 100 fL    MCH 32.1 26.5 - 33.0 pg    MCHC 33.1 31.5 - 36.5 g/dL    RDW 13.4 10.0 - 15.0 %    Platelet Count 207 150 - 450 10e3/uL    % Neutrophils 54 %    % Lymphocytes 36 %    % Monocytes 9 %    % Eosinophils 1 %    % Basophils 1 %    % Immature Granulocytes 0 %    NRBCs per 100 WBC 0 <1 /100    Absolute Neutrophils 3.0 1.6 - 8.3 10e3/uL    Absolute Lymphocytes 2.0 0.8 - 5.3 10e3/uL    Absolute Monocytes 0.5 0.0 - 1.3 10e3/uL    Absolute Eosinophils 0.1 0.0 - 0.7 10e3/uL    Absolute Basophils 0.0 0.0 - 0.2 10e3/uL    Absolute Immature Granulocytes 0.0 <=0.4 10e3/uL    Absolute NRBCs 0.0 10e3/uL   Troponin T, High  Sensitivity    Collection Time: 09/02/24  1:44 PM   Result Value Ref Range    Troponin T, High Sensitivity 14 <=14 ng/L   Hemoglobin A1c    Collection Time: 09/02/24  1:44 PM   Result Value Ref Range    Hemoglobin A1C 6.1 (H) <5.7 %   Lipid panel reflex to direct LDL: Non-fasting    Collection Time: 09/02/24  1:44 PM   Result Value Ref Range    Cholesterol 174 <200 mg/dL    Triglycerides 90 <150 mg/dL    Direct Measure HDL 65 >=50 mg/dL    LDL Cholesterol Calculated 91 <=100 mg/dL    Non HDL Cholesterol 109 <130 mg/dL   Extra Red Top Tube    Collection Time: 09/02/24  1:44 PM   Result Value Ref Range    Hold Specimen JIC    Glucose by meter    Collection Time: 09/02/24  7:14 PM   Result Value Ref Range    GLUCOSE BY METER POCT 98 70 - 99 mg/dL   Glucose by meter    Collection Time: 09/02/24 10:02 PM   Result Value Ref Range    GLUCOSE BY METER POCT 162 (H) 70 - 99 mg/dL   CBC with platelets    Collection Time: 09/03/24  7:02 AM   Result Value Ref Range    WBC Count 6.8 4.0 - 11.0 10e3/uL    RBC Count 4.50 3.80 - 5.20 10e6/uL    Hemoglobin 14.5 11.7 - 15.7 g/dL    Hematocrit 43.0 35.0 - 47.0 %    MCV 96 78 - 100 fL    MCH 32.2 26.5 - 33.0 pg    MCHC 33.7 31.5 - 36.5 g/dL    RDW 13.3 10.0 - 15.0 %    Platelet Count 204 150 - 450 10e3/uL       Total time spent for face to face visit, reviewing labs/imaging studies, counseling and coordination of care was: 1 Hour 15 Minutes. More than 50% of this time was spent on counseling and coordination of care.    Dragon software used in the dictation of this note.    Rebecca Prakash MD  [facility] Neurology [facility] - [address], [address]  [place] [address] [phone]

## 2024-09-03 NOTE — PROGRESS NOTES
SLP orders received. Chart reviewed and discussed with care team. Spoke to the pt and her son in the room. Her son interpreted. Speech-Language Pathology Evaluations not indicated due to no reported or resolved deficits related to speech, language, or cognition. Patient tolerating current diet of Regular and Thin with no s/s aspiration per RN. No acute SLP needs identified.? Defer discharge recommendations to treatment team.? Will complete orders.

## 2024-09-03 NOTE — CONSULTS
Care Management Initial Consult    General Information  Assessment completed with: Family, Son Keyla  Type of CM/SW Visit: Initial Assessment    Primary Care Provider verified and updated as needed: Yes   Readmission within the last 30 days:        Reason for Consult: discharge planning  Advance Care Planning:            Communication Assessment  Patient's communication style: spoken language (non-English)    Hearing Difficulty or Deaf: no   Wear Glasses or Blind: no    Cognitive  Cognitive/Neuro/Behavioral: WDL  Level of Consciousness: alert     Orientation: oriented x 4  Mood/Behavior: calm, cooperative          Living Environment:   People in home: grandchild(d[place])     Current living Arrangements: house      Able to return to prior arrangements: yes       Family/Social Support:  Care provided by: self, child(d[place])  Provides care for: no one  Marital Status: Single  Support system: Children          Description of Support System: Supportive    Support Assessment: Adequate family and caregiver support    Current Resources:   Patient receiving home care services:          Community Resources:    Equipment currently used at home: none  Supplies currently used at home:      Employment/Financial:  Employment Status: unemployed        Financial Concerns:             Does the patient's insurance plan have a 3 day qualifying hospital stay waiver?  No    Lifestyle & Psychosocial Needs:  Social Determinants of Health     Food Insecurity: Low Risk  (9/2/2024)    Food Insecurity     Within the past 12 months, did you worry that your food would run out before you got money to buy more?: No     Within the past 12 months, did the food you bought just not last and you didn t have money to get more?: No   Depression: Not at risk (10/9/2023)    PHQ-2     PHQ-2 Score: 0   Housing Stability: High Risk (9/2/2024)    Housing Stability     Do you have housing? : No     Are you worried about losing your housing?: No   Tobacco Use: Low Risk   (9/2/2024)    Patient History     Smoking Tobacco Use: Never     Smokeless Tobacco Use: Never     Passive Exposure: Not on file   Financial Resource Strain: Low Risk  (9/2/2024)    Financial Resource Strain     Within the past 12 months, have you or your family members you live with been unable to get utilities (heat, electricity) when it was really needed?: No   Alcohol Use: Not on file   Transportation Needs: Low Risk  (9/2/2024)    Transportation Needs     Within the past 12 months, has lack of transportation kept you from medical appointments, getting your medicines, non-medical meetings or appointments, work, or from getting things that you need?: No   Physical Activity: Not on file   Interpersonal Safety: Low Risk  (9/2/2024)    Interpersonal Safety     Do you feel physically and emotionally safe where you currently live?: Yes     Within the past 12 months, have you been hit, slapped, kicked or otherwise physically hurt by someone?: No     Within the past 12 months, have you been humiliated or emotionally abused in other ways by your partner or ex-partner?: No   Stress: Not on file   Social Connections: Not on file   Health Literacy: Not on file       Functional Status:  Prior to admission patient needed assistance:   Dependent ADLs:: Independent  Dependent IADLs:: Independent, Transportation       Mental Health Status:  Mental Health Status: No Current Concerns       Chemical Dependency Status:  Chemical Dependency Status: No Current Concerns       Additional Information:  Assessment completed at bedside with patient's son Keyla. Patient lives with her grandson at baseline. Patient is typically independent at baseline. PT and OT consulted and recommend home, no further therapy recommendations.   No discharge needs from care management.     Cait Salomon, CASSIESW

## 2024-09-03 NOTE — PLAN OF CARE
"[id number] shift     Problem: Adult Inpatient Plan of Care  Goal: Absence of Hospital-Acquired Illness or Injury  Intervention: Prevent and Manage VTE (Venous Thromboembolism) Risk  Recent Flowsheet Documentation  Taken 9/2/2024 1900 by Tita Howe RN  VTE Prevention/Management: SCDs on (sequential compression devices)    Problem: Risk for Delirium  Intervention: Minimize Safety Risk  Recent Flowsheet Documentation  Taken 9/2/2024 1900 by Tita Howe RN  Communication Enhancement Strategies:    utilized   one-step directions provided   nonverbal strategies used   family/caregiver assisted with communication   call light answered in person   extra time allowed for response  Enhanced Safety Measures:   room near unit station   family to remain at bedside     BP (!) 178/90 (BP Location: Left arm)   Pulse 70   Temp 98.2  F (36.8  C) (Oral)   Resp 18   Ht 1.473 m (4' 10\")   Wt 92.9 kg (204 lb 12.9 oz)   SpO2 96%   BMI 42.80 kg/m    A&O x4 VSS with permissive HTN >220 before PRN labetalol to be administered.  Scoring a (0) on NIHSS, pt denies headache, vision changes, chest pain, and reports to feel well.  Grandson Stephane escorted patient to P1 at 1830, and son Salinas has additionally come bedside.  Stroke Education and Be FAST provided in spoken language (Hmong).  Family states that patient's orientation has not deviated from her normal and that facial droop seen earlier has resolved.  No slurred speech / changes to speech.  This RN did not notice any droop.  Strength and ROM is equal bilaterally.  Passed swallow study without any difficulty, family has brought food bedside.  Patient is sleeping between assessments and cares. SR on telemetry, SCD's on.  Gabriela Howe RN                " PAST SURGICAL HISTORY:  No significant past surgical history

## 2024-09-03 NOTE — PROGRESS NOTES
[facility]    Medicine Progress Note - Hospitalist Service    Date of Admission:  9/2/2024    Assessment & Plan                Konrad Mcarthur is a 69 year old female with HTN and HLD who was admitted on 9/2/2024. She presented to the ED for evaluation of R arm weakness found to have subacute stroke. Hospital Day: 2         Subacute Stroke   R ICA saccular pseudoaneurysm  Presented to the ER for evaluation right arm/hand weakness that began the day before  -- MR: Small patchy focus of acute infarction in the left posterior frontal centrum semiovale. Also noted 5 mm saccular pseudoaneurysm of R ICA, chronic microhemorrhage of R thalamic nucleus   -- Neurology recommending DAPT for 21 days followed by ASA alone  -- Continue PTA atorvastatin, LDL 91 and will uptitrate 10--> 20mg  -- Stroke order set used  -- Neurochecks  -- Echo pending   -- Telemetry. Consider holter at discharge  -- cleared by PT and patient declined OT eval, accepted sponge for hand exercises. Weakness has improved but would certainly recommend ongoing OT.  -monitor BP (elevated) and BG (mildly elevated), likely home tomorrow     Essential hypertension  -- Permissive HTN; PRN labetolol/hydralazine   -- HOLDing PTA atenolol-chlorthalidone for now. Will resume atenolol at half-dose in AM  -- Close monitoring      Hyperlipidemia   -- Continue PTA statin  -LDL 91, upitrate statin as above  -lipid profile 1 month     PreDM   -- A1C 6.1%  -- Glucose checks; monitor  -- , added sliding scale  -- PCP follow-up    Obesity  -BMI 42 noted          Diet: Regular Diet Adult    DVT Prophylaxis: Moderate risk.   Pneumatic Compression Devices  Hudson Catheter: Not present  Lines: None     Cardiac Monitoring: ACTIVE order. Indication: Stroke, acute (48 hours)  Code Status: No CPR- Do NOT Intubate      Clinically Significant Risk Factors Present on Admission                  # Hypertension: Noted on problem list         # Severe Obesity:  "Estimated body mass index is 42.8 kg/m  as calculated from the following:    Height as of this encounter: 1.473 m (4' 10\").    Weight as of this encounter: 92.9 kg (204 lb 12.9 oz).                    Disposition Plan     Medically Ready for Discharge: Anticipated Tomorrow         Discharge barrier(s): monitor BP, persistent symptoms  Care discussed with: patient, family      Juana Preciado MD  Hospitalist Service  [facility]  Securely message with [employer] (more info)  Text page via [employer] Paging/Directory   ______________________________________________________________________      Physical Exam   Vital Signs: Temp: 98.9  F (37.2  C) Temp src: Oral BP: (!) 176/88 Pulse: 80   Resp: 18 SpO2: 97 % O2 Device: None (Room air)    Weight: 204 lbs 12.92 oz    General: in no apparent distress, non-toxic, and alert female lying in hospital bed oriented x3  HEENT: Head normocephalic atraumatic, oral mucosa moist. Sclerae anicteric  CV: Regular rhythm, normal rate, no murmurs  Resp: No wheezes, no rales or rhonchi, no focal consolidations  GI: Belly soft, nondistended, nontender, bowel sounds present  Skin: No rashes or lesions  Extremities: No peripheral edema  Psych: Normal affect, mood euthymic  Neuro: Grossly normal      Medical Decision Making               Data   Recent Results (from the past 16 hour(s))   Glucose by meter    Collection Time: 09/02/24 10:02 PM   Result Value Ref Range    GLUCOSE BY METER POCT 162 (H) 70 - 99 mg/dL   CBC with platelets    Collection Time: 09/03/24  7:02 AM   Result Value Ref Range    WBC Count 6.8 4.0 - 11.0 10e3/uL    RBC Count 4.50 3.80 - 5.20 10e6/uL    Hemoglobin 14.5 11.7 - 15.7 g/dL    Hematocrit 43.0 35.0 - 47.0 %    MCV 96 78 - 100 fL    MCH 32.2 26.5 - 33.0 pg    MCHC 33.7 31.5 - 36.5 g/dL    RDW 13.3 10.0 - 15.0 %    Platelet Count 204 150 - 450 10e3/uL   Basic metabolic panel    Collection Time: 09/03/24  7:02 AM   Result Value Ref Range    Sodium 139 135 " - 145 mmol/L    Potassium 3.8 3.4 - 5.3 mmol/L    Chloride 103 98 - 107 mmol/L    Carbon Dioxide (CO2) 24 22 - 29 mmol/L    Anion Gap 12 7 - 15 mmol/L    Urea Nitrogen 12.5 8.0 - 23.0 mg/dL    Creatinine 0.74 0.51 - 0.95 mg/dL    GFR Estimate 86 >60 mL/min/1.73m2    Calcium 9.3 8.8 - 10.4 mg/dL    Glucose 125 (H) 70 - 99 mg/dL   Glucose by meter    Collection Time: 09/03/24 12:28 PM   Result Value Ref Range    GLUCOSE BY METER POCT 159 (H) 70 - 99 mg/dL       Interval History     Patient reports improvement in right hand symptoms.  No other bothersome symptoms.  We discussed plan for workup and management of stroke.  Not ready for discharge today, possibly tomorrow.

## 2024-09-04 ENCOUNTER — VIRTUAL VISIT (OUTPATIENT)
Dept: INTERPRETER SERVICES | Facility: CLINIC | Age: 69
End: 2024-09-04
Payer: COMMERCIAL

## 2024-09-04 VITALS
RESPIRATION RATE: 18 BRPM | DIASTOLIC BLOOD PRESSURE: 82 MMHG | HEART RATE: 67 BPM | TEMPERATURE: 98.5 F | BODY MASS INDEX: 42.99 KG/M2 | WEIGHT: 204.81 LBS | OXYGEN SATURATION: 94 % | HEIGHT: 58 IN | SYSTOLIC BLOOD PRESSURE: 145 MMHG

## 2024-09-04 LAB
ATRIAL RATE - MUSE: 71 BPM
DIASTOLIC BLOOD PRESSURE - MUSE: NORMAL MMHG
GLUCOSE BLDC GLUCOMTR-MCNC: 105 MG/DL (ref 70–99)
GLUCOSE BLDC GLUCOMTR-MCNC: 115 MG/DL (ref 70–99)
INTERPRETATION ECG - MUSE: NORMAL
P AXIS - MUSE: 63 DEGREES
PR INTERVAL - MUSE: 182 MS
QRS DURATION - MUSE: 92 MS
QT - MUSE: 416 MS
QTC - MUSE: 452 MS
R AXIS - MUSE: -33 DEGREES
SYSTOLIC BLOOD PRESSURE - MUSE: NORMAL MMHG
T AXIS - MUSE: 49 DEGREES
VENTRICULAR RATE- MUSE: 71 BPM

## 2024-09-04 PROCEDURE — 250N000013 HC RX MED GY IP 250 OP 250 PS 637: Performed by: HOSPITALIST

## 2024-09-04 PROCEDURE — 99239 HOSP IP/OBS DSCHRG MGMT >30: CPT | Performed by: INTERNAL MEDICINE

## 2024-09-04 PROCEDURE — T1013 SIGN LANG/ORAL INTERPRETER: HCPCS | Mod: U4,TEL,95 | Performed by: INTERPRETER

## 2024-09-04 PROCEDURE — 250N000013 HC RX MED GY IP 250 OP 250 PS 637

## 2024-09-04 RX ORDER — CLOPIDOGREL BISULFATE 75 MG/1
75 TABLET ORAL DAILY
Qty: 21 TABLET | Refills: 0 | Status: SHIPPED | OUTPATIENT
Start: 2024-09-05

## 2024-09-04 RX ORDER — ATORVASTATIN CALCIUM 10 MG/1
20 TABLET, FILM COATED ORAL DAILY
Qty: 90 TABLET | Refills: 4 | Status: SHIPPED | OUTPATIENT
Start: 2024-09-04 | End: 2024-09-11

## 2024-09-04 RX ORDER — ASPIRIN 81 MG/1
81 TABLET ORAL DAILY
Qty: 30 TABLET | Refills: 0 | Status: SHIPPED | OUTPATIENT
Start: 2024-09-05

## 2024-09-04 RX ADMIN — CLOPIDOGREL BISULFATE 75 MG: 75 TABLET ORAL at 08:49

## 2024-09-04 RX ADMIN — ATORVASTATIN CALCIUM 20 MG: 10 TABLET, FILM COATED ORAL at 08:49

## 2024-09-04 RX ADMIN — ATENOLOL 50 MG: 25 TABLET ORAL at 08:48

## 2024-09-04 RX ADMIN — ASPIRIN 81 MG: 81 TABLET, COATED ORAL at 08:48

## 2024-09-04 ASSESSMENT — ACTIVITIES OF DAILY LIVING (ADL)
ADLS_ACUITY_SCORE: 22

## 2024-09-04 NOTE — PLAN OF CARE
"Goal Outcome Evaluation:     Problem: Adult Inpatient Plan of Care  Goal: Optimal Comfort and Wellbeing  Outcome: Progressing     Problem: Adult Inpatient Plan of Care  Goal: Optimal Comfort and Wellbeing  Outcome: Progressing     Problem: Risk for Delirium  Goal: Improved Sleep  Outcome: Progressing    Acute  issue: Pt A & O x 4 Pleasant and cooperative Hmtran kong , VSS, Sinus Tachy on tele HR \"101   \" on RA  O2SATs \"  96% \", Q4 Neuro checks /NIH. NIH Score 0. Ambulates: Independently moves in bed.  Pain: denies .Lines/Drains: PIV saline locked    PRN meds given: none   Procedures/Labs: none  Diet: Regular.  Safety:  bed locked in the lowest position with call light within reach, and family at the bedside at the time of writing. POC discussed questions encouraged and answered. Plan to \"discharge to home \" Education provided on: . Pt resting with RR \"20\", even and unlabored  bilateral chest rise and fall. Pt is arousalable with verbal stimulation. No concerns or incidents noted on my shift.            Vital signs:  Temp: 97.8  F (36.6  C) Temp src: Oral BP: (!) 162/75 Pulse: 76   Resp: 18 SpO2: 96 % O2 Device: None (Room air)   Height: 147.3 cm (4' 10\") Weight: 92.9 kg (204 lb 12.9 oz)  Estimated body mass index is 42.8 kg/m  as calculated from the following:    Height as of this encounter: 1.473 m (4' 10\").    Weight as of this encounter: 92.9 kg (204 lb 12.9 oz).       "

## 2024-09-04 NOTE — PLAN OF CARE
Goal Outcome Evaluation:       Pt discharged at 1114, paperwork reviewed with haily Knight who speaks English.  Medications reviewed, they will  Rx's at the pharmacy across the street instead of waiting for them.  Belongings gathered by family, questions answered.  PT brought pt a cane to take home.  NSR on telemetry.  Pharmacy said atrovastatin was changed to 20 mg daily instead of 10 BID due to insurance coverage.  Family and paperwork updated.  Tita Kilgore, RN

## 2024-09-04 NOTE — DISCHARGE SUMMARY
"[facility]  Hospitalist Discharge Summary      Date of Admission:  9/2/2024  Date of Discharge:  9/4/2024  Discharging Provider: Aislinn Hoang MD  Discharge Service: Hospitalist Service    Discharge Diagnoses   Subacute stroke  Right ICA saccular pseudoaneurysm  Hypertension  Hyperlipidemia  Prediabetes    Clinically Significant Risk Factors     # Severe Obesity: Estimated body mass index is 42.8 kg/m  as calculated from the following:    Height as of this encounter: 1.473 m (4' 10\").    Weight as of this encounter: 92.9 kg (204 lb 12.9 oz).       Follow-ups Needed After Discharge   Follow-up Appointments     Follow-up and recommended labs and tests       Follow up with primary care provider, Taylor Wilkins, within 7 days for   hospital follow- up.    Continue aspirin and Plavix for the next 21 days followed by aspirin only   afterwards  Outpatient follow-up at the neurology clinic as arranged          Discharge Disposition   Discharged to home  Condition at discharge: Stable    Hospital Course   Konrad Mcarthur is a 69-year-old woman with history of hypertension and hyperlipidemia admitted on 9/2/2024 with right arm weakness and found to have subacute stroke in left posterior frontal centrum semiovale and right ICA pseudoaneurysm.    Brain MRI showed small patchy focus of acute infarction in the left posterior frontal centrum semiovale and 5 mm saccular pseudoaneurysm of right ICA, as well as chronic microhemorrhage of right thalamic nucleus.    Echocardiogram was negative for PFO.  Neurologist recommends dual antiplatelet therapy with aspirin and Plavix for 21 days followed by aspirin monotherapy afterwards with plan to follow-up at the neurology clinic as outpatient.    Symptoms have resolved and patient is clinically stable for discharge at this time.          Consultations This Hospital Stay   NEUROLOGY IP STROKE CONSULT  SPEECH LANGUAGE PATH ADULT IP CONSULT  PHARMACY IP " CONSULT  PHYSICAL THERAPY ADULT IP CONSULT  OCCUPATIONAL THERAPY ADULT IP CONSULT  REHAB ADMISSIONS LIAISON IP CONSULT  CARE MANAGEMENT / SOCIAL WORK IP CONSULT  SMOKING CESSATION PROGRAM IP CONSULT    Code Status   No CPR- Do NOT Intubate    Time Spent on this Encounter   I, Aislinn Hoang MD, personally saw the patient today and spent greater than 30 minutes discharging this patient.       Aislinn Hoang MD  [address] [address]  Phone: [phone]  Fax: [phone]  ______________________________________________________________________    Physical Exam   Vital Signs: Temp: 98.5  F (36.9  C) Temp src: Oral BP: (!) 145/82 (RN notified) Pulse: 67   Resp: 18 SpO2: 94 % O2 Device: None (Room air)    Weight: 204 lbs 12.92 oz    General appearance: Obese, awake, Alert, Cooperative, not in any obvious distress and appears stated age   HEENT: Normocephalic, atraumatic, conjunctiva clear without icterus and ears without discharge  Lungs: Clear to auscultation bilaterally, no wheezing, good air exchange, normal work of breathing  Cardiovascular: Regular Rate and Rythm, normal apical impulse, normal S1 and S2, no lower extremity edema bilaterally  Abdomen: Soft, non-tender and Non-distended, active bowel sounds  Skin: Skin color, texture normal and bruising or bleeding. No rashes or lesions over face, neck, arms and legs, turgor normal.  Musculoskeletal: No bony deformities or joint tenderness. Normal ROM upon flexion & extension.   Neurologic: Alert & Oriented X 3, Facial symmetry preserved and upper & lower extremities moving well with symmetry  Psychiatric: Unable to assess         Primary Care Physician   Taylor Wilkins    Discharge Orders      Reason for your hospital stay    Subacute stroke  Right ICA saccular pseudoaneurysm  Hypertension  Hyperlipidemia  Prediabetes     Follow-up and recommended labs and tests     Follow up with primary care  provider, Taylor Wilkins, within 7 days for hospital follow- up.    Continue aspirin and Plavix for the next 21 days followed by aspirin only afterwards  Outpatient follow-up at the neurology clinic as arranged     Activity    Your activity upon discharge: activity as tolerated     Diet    Follow this diet upon discharge: Current Diet:Orders Placed This Encounter      Regular Diet Adult       Significant Results and Procedures   Results for orders placed or performed during the hospital encounter of 09/02/24   MR Brain w/o & w Contrast    Narrative    EXAM: MR BRAIN W/O and W CONTRAST, MRA NECK (CAROTIDS) W/O and W CONTRAST, MRA BRAIN ([place]) W/O CONTRAST  LOCATION: [facility]  DATE: 9/2/2024    INDICATION: Right  weak since yesterday 9/1/2024 at 11am, right leg drift on examination, TIA/Stroke  COMPARISON: None.  CONTRAST: 9 mL Gadavist  TECHNIQUE:   1) Routine multiplanar multisequence head MRI without and with intravenous contrast.  2) 3D time-of-flight head MRA without intravenous contrast.  3) Neck MRA without and with IV contrast. Stenosis measurements made according to NASCET criteria unless otherwise specified.    FINDINGS:  HEAD MRI:  INTRACRANIAL CONTENTS: Approximately 8 x 6 x 6 mm patchy focus of acute infarction in the posterior left frontal centrum semiovale. Mild to moderate chronic small vessel ischemic changes throughout the cerebral hemispheric white matter bilaterally.   Multiple chronic bilateral basal ganglia and thalamic lacunar infarcts. No acute intracranial hemorrhage or abnormal extra axial fluid collection. No mass effect, midline shift or herniation. The ventricles are not enlarged. Normal position of the   cerebellar tonsils. No abnormal foci of intracranial enhancement. Chronic microhemorrhage in the right thalamic nucleus.    SELLA: No abnormality accounting for technique.    OSSEOUS STRUCTURES/SOFT TISSUES: Normal marrow signal. The major  intracranial vascular flow voids are maintained.     ORBITS: No abnormality accounting for technique.     SINUSES/MASTOIDS: Mild mucosal thickening scattered about the paranasal sinuses. No middle ear or mastoid effusion.     HEAD MRA:   ANTERIOR CIRCULATION: No stenosis/occlusion, aneurysm, or high flow vascular malformation. Fetal origin of the right posterior cerebral artery from the anterior circulation. Normal variant mildly hypoplastic left A1 segment.    POSTERIOR CIRCULATION: No stenosis/occlusion, aneurysm, or high flow vascular malformation. Balanced vertebral arteries supply a normal basilar artery.     NECK MRA:   RIGHT CAROTID: Dorsally oriented saccular pseudoaneurysm arising from the subpetrosal segment of the right internal carotid artery, which measures 8 mm at the neck and 5 mm from dome to base. No intramural hematoma. No measurable stenosis.    LEFT CAROTID: No measurable stenosis or dissection.    VERTEBRAL ARTERIES: No focal stenosis or dissection. Balanced vertebral arteries.    AORTIC ARCH: Classic aortic arch anatomy with no significant stenosis at the origin of the great vessels.      Impression    IMPRESSION:  HEAD MRI:   1.  Small patchy focus of acute infarction in the left posterior frontal centrum semiovale.  2.  Moderate chronic small vessel ischemic disease.  3.  Chronic microhemorrhage in the right thalamic nucleus.    HEAD MRA:   1.  Normal MRA [place].    NECK MRA:  1.  Widely patent major cervical arteries. No large vessel occlusion or stenosis.  2.  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   MRA Neck (Carotids) wo & w Contrast    Narrative    EXAM: MR BRAIN W/O and W CONTRAST, MRA NECK (CAROTIDS) W/O and W CONTRAST, MRA BRAIN ([place]) W/O CONTRAST  LOCATION: [facility]  DATE: 9/2/2024    INDICATION: Right  weak  since yesterday 9/1/2024 at 11am, right leg drift on examination, TIA/Stroke  COMPARISON: None.  CONTRAST: 9 mL Gadavist  TECHNIQUE:   1) Routine multiplanar multisequence head MRI without and with intravenous contrast.  2) 3D time-of-flight head MRA without intravenous contrast.  3) Neck MRA without and with IV contrast. Stenosis measurements made according to NASCET criteria unless otherwise specified.    FINDINGS:  HEAD MRI:  INTRACRANIAL CONTENTS: Approximately 8 x 6 x 6 mm patchy focus of acute infarction in the posterior left frontal centrum semiovale. Mild to moderate chronic small vessel ischemic changes throughout the cerebral hemispheric white matter bilaterally.   Multiple chronic bilateral basal ganglia and thalamic lacunar infarcts. No acute intracranial hemorrhage or abnormal extra axial fluid collection. No mass effect, midline shift or herniation. The ventricles are not enlarged. Normal position of the   cerebellar tonsils. No abnormal foci of intracranial enhancement. Chronic microhemorrhage in the right thalamic nucleus.    SELLA: No abnormality accounting for technique.    OSSEOUS STRUCTURES/SOFT TISSUES: Normal marrow signal. The major intracranial vascular flow voids are maintained.     ORBITS: No abnormality accounting for technique.     SINUSES/MASTOIDS: Mild mucosal thickening scattered about the paranasal sinuses. No middle ear or mastoid effusion.     HEAD MRA:   ANTERIOR CIRCULATION: No stenosis/occlusion, aneurysm, or high flow vascular malformation. Fetal origin of the right posterior cerebral artery from the anterior circulation. Normal variant mildly hypoplastic left A1 segment.    POSTERIOR CIRCULATION: No stenosis/occlusion, aneurysm, or high flow vascular malformation. Balanced vertebral arteries supply a normal basilar artery.     NECK MRA:   RIGHT CAROTID: Dorsally oriented saccular pseudoaneurysm arising from the subpetrosal segment of the right internal carotid artery, which  measures 8 mm at the neck and 5 mm from dome to base. No intramural hematoma. No measurable stenosis.    LEFT CAROTID: No measurable stenosis or dissection.    VERTEBRAL ARTERIES: No focal stenosis or dissection. Balanced vertebral arteries.    AORTIC ARCH: Classic aortic arch anatomy with no significant stenosis at the origin of the great vessels.      Impression    IMPRESSION:  HEAD MRI:   1.  Small patchy focus of acute infarction in the left posterior frontal centrum semiovale.  2.  Moderate chronic small vessel ischemic disease.  3.  Chronic microhemorrhage in the right thalamic nucleus.    HEAD MRA:   1.  Normal MRA [place].    NECK MRA:  1.  Widely patent major cervical arteries. No large vessel occlusion or stenosis.  2.  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   MRA Brain ([place]) wo Contrast    Narrative    EXAM: MR BRAIN W/O and W CONTRAST, MRA NECK (CAROTIDS) W/O and W CONTRAST, MRA BRAIN ([place]) W/O CONTRAST  LOCATION: [facility]  DATE: 9/2/2024    INDICATION: Right  weak since yesterday 9/1/2024 at 11am, right leg drift on examination, TIA/Stroke  COMPARISON: None.  CONTRAST: 9 mL Gadavist  TECHNIQUE:   1) Routine multiplanar multisequence head MRI without and with intravenous contrast.  2) 3D time-of-flight head MRA without intravenous contrast.  3) Neck MRA without and with IV contrast. Stenosis measurements made according to NASCET criteria unless otherwise specified.    FINDINGS:  HEAD MRI:  INTRACRANIAL CONTENTS: Approximately 8 x 6 x 6 mm patchy focus of acute infarction in the posterior left frontal centrum semiovale. Mild to moderate chronic small vessel ischemic changes throughout the cerebral hemispheric white matter bilaterally.   Multiple chronic bilateral basal ganglia and thalamic lacunar infarcts. No acute  intracranial hemorrhage or abnormal extra axial fluid collection. No mass effect, midline shift or herniation. The ventricles are not enlarged. Normal position of the   cerebellar tonsils. No abnormal foci of intracranial enhancement. Chronic microhemorrhage in the right thalamic nucleus.    SELLA: No abnormality accounting for technique.    OSSEOUS STRUCTURES/SOFT TISSUES: Normal marrow signal. The major intracranial vascular flow voids are maintained.     ORBITS: No abnormality accounting for technique.     SINUSES/MASTOIDS: Mild mucosal thickening scattered about the paranasal sinuses. No middle ear or mastoid effusion.     HEAD MRA:   ANTERIOR CIRCULATION: No stenosis/occlusion, aneurysm, or high flow vascular malformation. Fetal origin of the right posterior cerebral artery from the anterior circulation. Normal variant mildly hypoplastic left A1 segment.    POSTERIOR CIRCULATION: No stenosis/occlusion, aneurysm, or high flow vascular malformation. Balanced vertebral arteries supply a normal basilar artery.     NECK MRA:   RIGHT CAROTID: Dorsally oriented saccular pseudoaneurysm arising from the subpetrosal segment of the right internal carotid artery, which measures 8 mm at the neck and 5 mm from dome to base. No intramural hematoma. No measurable stenosis.    LEFT CAROTID: No measurable stenosis or dissection.    VERTEBRAL ARTERIES: No focal stenosis or dissection. Balanced vertebral arteries.    AORTIC ARCH: Classic aortic arch anatomy with no significant stenosis at the origin of the great vessels.      Impression    IMPRESSION:  HEAD MRI:   1.  Small patchy focus of acute infarction in the left posterior frontal centrum semiovale.  2.  Moderate chronic small vessel ischemic disease.  3.  Chronic microhemorrhage in the right thalamic nucleus.    HEAD MRA:   1.  Normal MRA [place].    NECK MRA:  1.  Widely patent major cervical arteries. No large vessel occlusion or stenosis.  2.  5 mm dorsally  oriented saccular pseudoaneurysm arising from the subpetrosal segment of the right internal carotid artery, age-indeterminate sequelae of dissection.   Echocardiogram Complete     Value    LVEF  > 65%    [facility]    [id number]  [id number]  [id number]  [id number]^LINSEY^[place] [address]     Name: NUBIA ENRIQUE  MRN: [id number]  : 1955  Study Date: 2024 10:38 AM  Age: 69 yrs  Gender: Female  Patient [facility]  Reason For Study: CVA  Ordering Physician: SUKHWINDER STILES  Performed By:      BSA: 1.8 m2  Height: 58 in  Weight: 204 lb  HR: 62  ______________________________________________________________________________  Procedure  Complete Echo Adult. Complete Bubble Echo Adult. Definity (NDC #88809-792)  given intravenously.  ______________________________________________________________________________  Interpretation Summary     Left ventricular size, wall motion and function are normal. The ejection  fraction is > 65%.  Normal right ventricle size and systolic function.  Ascending Aorta dilatation is present.  A contrast injection (Bubble Study) was performed that was negative for flow  across the interatrial septum.  No hemodynamically significant valvular abnormalities on 2D or color flow  imaging.  ______________________________________________________________________________  Left Ventricle  Left ventricular size, wall motion and function are normal. The ejection  fraction is > 65%. There is normal left ventricular wall thickness. Left  ventricular diastolic function is abnormal. Diastolic Doppler findings (E/E'  ratio and/or other parameters) suggest left ventricular filling pressures are  increased. No regional wall motion abnormalities noted.     Right Ventricle  Normal right ventricle size and systolic function. TAPSE is normal, which is  consistent with normal right ventricular systolic function.     Atria  The left atrium is mildly dilated. Right  atrial size is normal. There is no  color Doppler evidence of an atrial shunt. A contrast injection (Bubble Study)  was performed that was negative for flow across the interatrial septum. A  Valsalva maneuver was performed.     Mitral Valve  Mitral valve leaflets appear normal. There is no evidence of mitral stenosis  or clinically significant mitral regurgitation.     Tricuspid Valve  The tricuspid valve is not well visualized, but is grossly normal. Right  ventricular systolic pressure could not be approximated due to inadequate  tricuspid regurgitation.     Aortic Valve  Aortic valve leaflets appear normal. There is no evidence of aortic stenosis  or clinically significant aortic regurgitation.     Pulmonic Valve  The pulmonic valve is not well seen, but is grossly normal. There is trace  pulmonic valvular regurgitation.     Vessels  The aorta root is normal. Ascending Aorta dilatation is present. IVC diameter  <2.1 cm collapsing >50% with sniff suggests a normal RA pressure of 3 mmHg.     Pericardium  There is no pericardial effusion.     ______________________________________________________________________________  MMode/2D Measurements & Calculations  IVSd: 1.5 cm  LVIDd: 3.8 cm  LVIDs: 2.6 cm  LVPWd: 1.3 cm     FS: 33.6 %  LV mass(C)d: 189.4 grams  LV mass(C)dI: 103.1 grams/m2  Ao root diam: 3.3 cm  asc Aorta Diam: 4.1 cm  LVOT diam: 2.1 cm  LVOT area: 3.5 cm2  Ao root diam index Ht(cm/m): 2.2  Ao root diam index BSA (cm/m2): 1.8  Asc Ao diam index BSA (cm/m2): 2.2  Asc Ao diam index Ht(cm/m): 2.8  LA Volume (BP): 42.9 ml     LA Volume Index (BP): 23.3 ml/m2  LA Volume Indexed (AL/bp): 24.8 ml/m2  RWT: 0.65  TAPSE: 2.0 cm     Doppler Measurements & Calculations  MV E max miles: 67.4 cm/sec  MV A max miles: 96.3 cm/sec  MV E/A: 0.70  MV dec slope: 252.0 cm/sec2  MV dec time: 0.27 sec  Ao V2 max: 145.0 cm/sec  Ao max P.0 mmHg  Ao V2 mean: 113.0 cm/sec  Ao mean P.0 mmHg  Ao V2 VTI: 28.1 cm  MAGALIE(I,D): 2.8  cm2  MAGALIE(V,D): 2.6 cm2     LV V1 max P.8 mmHg  LV V1 max: 109.0 cm/sec  LV V1 VTI: 22.5 cm  SV(LVOT): 77.9 ml  SI(LVOT): 42.4 ml/m2  PA V2 max: 141.0 cm/sec  PA max P.0 mmHg  PA acc time: 0.10 sec  AV Bryan Ratio (DI): 0.75  MAGALIE Index (cm2/m2): 1.5  E/E' av.5  Lateral E/e': 10.3  Medial E/e': 12.6  RV S Bryan: 16.8 cm/sec     ______________________________________________________________________________  Report approved by: Kim Ortiz 2024 02:27 PM             Discharge Medications   Current Discharge Medication List        START taking these medications    Details   aspirin 81 MG EC tablet Take 1 tablet (81 mg) by mouth daily.  Qty: 30 tablet, Refills: 0    Associated Diagnoses: Cerebrovascular accident (CVA), unspecified mechanism (H); Essential hypertension, benign; Primary osteoarthritis of both knees; Morbid obesity (H); Non-compliance with treatment      clopidogrel (PLAVIX) 75 MG tablet Take 1 tablet (75 mg) by mouth or NG Tube daily.  Qty: 21 tablet, Refills: 0    Associated Diagnoses: Cerebrovascular accident (CVA), unspecified mechanism (H); Essential hypertension, benign; Primary osteoarthritis of both knees; Morbid obesity (H); Non-compliance with treatment           CONTINUE these medications which have CHANGED    Details   atorvastatin (LIPITOR) 10 MG tablet Take 2 tablets (20 mg) by mouth daily.  Qty: 90 tablet, Refills: 4    Associated Diagnoses: Essential hypertension, benign           CONTINUE these medications which have NOT CHANGED    Details   atenolol-chlorthalidone (TENORETIC) 100-25 MG tablet Take 1 tablet by mouth daily  Qty: 90 tablet, Refills: 4      vitamin D3 (CHOLECALCIFEROL) 50 mcg (2000 units) tablet Take 1 tablet (50 mcg) by mouth daily  Qty: 90 tablet, Refills: 1           Allergies   Allergies   Allergen Reactions    Influenza Virus Vaccine      Cough unstopped per pt when first received it in , ever since then, don't get flu shot.

## 2024-09-04 NOTE — PROGRESS NOTES
Brief Neurology Note:    Patient is scoring 0 on NIHSS  Echocardiogram negative for PFO. Aortic dilatation, will defer to OP primary/cardiology regarding this   Continue DAPT for 21 days followed by ASA monotherapy.  Continue statin.  Ok to normalize BP.  No further inpatient neurology evaluation/intervention needed.  Will sign off.  Follow up in Neurology clinic in 2-3 months.     Rebecca Prakash MD

## 2024-09-04 NOTE — PROGRESS NOTES
Care Management Discharge Note    Discharge Date: 09/04/2024       Discharge Disposition: Home    Discharge Services: None    Discharge DME: None    Discharge Transportation: family or friend will provide    Private pay costs discussed: Not applicable    Does the patient's insurance plan have a 3 day qualifying hospital stay waiver?  No    PAS Confirmation Code: N/A  Patient/family educated on Medicare website which has current facility and service quality ratings: no    Education Provided on the Discharge Plan: Yes  Persons Notified of Discharge Plans: Per Team  Patient/Family in Agreement with the Plan: yes    Handoff Referral Completed: Yes    Additional Information:  Plan is to discharge home with family. Pt is independent at baseline but has family to assist as needed after discharge. Family to transport. No CM needs requested or recommended for discharge.    CM will sign off. Please contact CM if any additional needs arise prior to discharge.      DAVID Hall

## 2024-09-05 ENCOUNTER — PATIENT OUTREACH (OUTPATIENT)
Dept: CARE COORDINATION | Facility: CLINIC | Age: 69
End: 2024-09-05
Payer: COMMERCIAL

## 2024-09-05 NOTE — PROGRESS NOTES
Clinic Care Coordination Contact  [facility]/Voicemail    Clinical Data: Care Coordinator Outreach    Outreach Documentation Number of Outreach Attempt   9/5/2024  11:00 AM 1       Left message on  haily's  voicemail with call back information and requested return call.    Plan: Care Coordinator will try to reach patient again in 1-2 business days.    TCM Episode created  UT x 1

## 2024-09-05 NOTE — LETTER
[facility] CARE COORDINATION  [address] [place] [address]    September 12, 2024    Konrad Mcarthur  [address] [place] [address]      Dear Konrad,    I am a clinic care coordinator who works with Taylor Wilkins MD with the [facility]. I recently tried to call and was unable to reach you. Below is a description of clinic care coordination and how I can further assist you.       The clinic care coordination team is made up of a registered nurse, , financial resource worker and community health worker who understand the health care system. The goal of clinic care coordination is to help you manage your health and improve access to the health care system. Our team works alongside your provider to assist you in determining your health and social needs. We can help you obtain health care and community resources, providing you with necessary information and education. We can work with you through any barriers and develop a care plan that helps coordinate and strengthen the communication between you and your care team.  Our services are voluntary and are offered without charge to you personally.    Please feel free to contact me with any questions or concerns regarding care coordination and what we can offer.      We are focused on providing you with the highest-quality healthcare experience possible.    Sincerely,     Hannah Brown RN  Clinic Care Coordination  [phone]

## 2024-09-06 NOTE — PROGRESS NOTES
Clinic Care Coordination Contact  [facility]/Voicemail    Clinical Data: Care Coordinator Outreach    Outreach Documentation Number of Outreach Attempt   9/5/2024  11:00 AM 1   9/6/2024   1:41 PM 2       No answer and did not leave a message.  No consent to communicate for family members listed.    Plan: Care Coordinator will send unable to contact letter with care coordinator contact information via mail. Care Coordinator will do no further outreaches at this time.    TCM Episode created  UT x 2

## 2024-09-11 ENCOUNTER — OFFICE VISIT (OUTPATIENT)
Dept: FAMILY MEDICINE | Facility: CLINIC | Age: 69
End: 2024-09-11
Payer: COMMERCIAL

## 2024-09-11 VITALS
WEIGHT: 206.9 LBS | OXYGEN SATURATION: 98 % | HEART RATE: 69 BPM | HEIGHT: 58 IN | BODY MASS INDEX: 43.43 KG/M2 | DIASTOLIC BLOOD PRESSURE: 101 MMHG | TEMPERATURE: 97.3 F | SYSTOLIC BLOOD PRESSURE: 156 MMHG

## 2024-09-11 DIAGNOSIS — Z29.11 NEED FOR VACCINATION AGAINST RESPIRATORY SYNCYTIAL VIRUS: ICD-10-CM

## 2024-09-11 DIAGNOSIS — Z23 NEED FOR TDAP VACCINATION: ICD-10-CM

## 2024-09-11 DIAGNOSIS — I63.9 CEREBROVASCULAR ACCIDENT (CVA), UNSPECIFIED MECHANISM (H): ICD-10-CM

## 2024-09-11 DIAGNOSIS — E66.01 MORBID OBESITY (H): ICD-10-CM

## 2024-09-11 DIAGNOSIS — Z09 HOSPITAL DISCHARGE FOLLOW-UP: Primary | ICD-10-CM

## 2024-09-11 DIAGNOSIS — I10 ESSENTIAL HYPERTENSION, BENIGN: ICD-10-CM

## 2024-09-11 PROCEDURE — 99495 TRANSJ CARE MGMT MOD F2F 14D: CPT | Performed by: FAMILY MEDICINE

## 2024-09-11 RX ORDER — ATENOLOL AND CHLORTHALIDONE TABLET 100; 25 MG/1; MG/1
1 TABLET ORAL DAILY
Qty: 90 TABLET | Refills: 4 | Status: SHIPPED | OUTPATIENT
Start: 2024-09-11

## 2024-09-11 RX ORDER — ATORVASTATIN CALCIUM 20 MG/1
20 TABLET, FILM COATED ORAL DAILY
COMMUNITY
Start: 2024-09-04

## 2024-09-11 NOTE — PROGRESS NOTES
Konrad was seen today for hospital f/u.    Diagnoses and all orders for this visit:    Hospital discharge follow-up    Cerebrovascular accident (CVA), unspecified mechanism (H)  -     atenolol-chlorthalidone (TENORETIC) 100-25 MG tablet; Take 1 tablet by mouth daily.    Essential hypertension, benign  -     atenolol-chlorthalidone (TENORETIC) 100-25 MG tablet; Take 1 tablet by mouth daily.    Need for Tdap vaccination  -     Tdap, tetanus-diptheria-acell pertussis, (BOOSTRIX) 5-2.5-18.5 LF-MCG/0.5 AMANDA injection; Inject 0.5 mLs into the muscle once for 1 dose.    Need for vaccination against respiratory syncytial virus  -     RSV vaccine, bivalent, ABRYSVO, injection; Inject 0.5 mLs into the muscle once for 1 dose. Pharmacist administered    Morbid obesity (H)    Other orders  -     REVIEW OF HEALTH MAINTENANCE PROTOCOL ORDERS  -     PRIMARY CARE FOLLOW-UP SCHEDULING; Future     Patient refuse all vaccines   Patient Instructions   Make sure take blood pressure medicine- atenolol+chlorthalidone  daily even when blood pressure is normal ( normal because you taking the medicine )  Take Asprin daily forever and plavix for 21 days   Lipitor 20 mg daily forever.  Follow up next month for medicare physical  Subjective     Konrad Mcarthur 69 year old  presenting for the following health issues:    Patient presents with:  Hospital F/U: [facility] 9/2-9/4:Subacute stroke, Right ICA saccular pseudoaneurysm, Hypertension, Hyperlipidemia, Prediabetes. Has not been taking BP meds for 2 weeks, ran out.              9/11/2024     9:46 AM   Additional Questions   Roomed by Beatriz BURNS MA       [facility]       Hospital Follow-up Visit:    Hospital/Nursing Home/IP Rehab Facility: [facility]  Date of Admission: 9/2  Date of Discharge: 9/4  Reason(s) for Admission:  Right arm weakness .  She feel weakness getting better, able to hold objects, hard to tie hair as unable to make tight bun. combing is ok , denies any new  chest pain pressure shortness of breath no change in vision. BP quite high today ,Does have BP monitor at home , when she take the medicine , last dose of BP medicine was 3 days ago she thought doctor told her not to take the medicine ??   Hospital Course  Konrad Mcarthur is a 69-year-old woman with history of hypertension and hyperlipidemia admitted on 9/2/2024 with right arm weakness and found to have subacute stroke in left posterior frontal centrum semiovale and right ICA pseudoaneurysm.   Brain MRI showed small patchy focus of acute infarction in the left posterior frontal centrum semiovale and 5 mm saccular pseudoaneurysm of right ICA, as well as chronic microhemorrhage of right thalamic nucleus.   Echocardiogram was negative for PFO.  Neurologist recommends dual antiplatelet therapy with aspirin and Plavix for 21 days followed by aspirin monotherapy afterwards with plan to follow-up at the neurology clinic as outpatient.   Symptoms have resolved and patient is clinically stable for discharge at this time.     Was the patient in the ICU or did the patient experience delirium during hospitalization?  No  Do you have any other stressors you would like to discuss with your provider? No    Problems taking medications regularly:  None  Medication changes since discharge: None  Problems adhering to non-medication therapy:  None    Summary of hospitalization:  [facility] discharge summary reviewed  Diagnostic Tests/Treatments reviewed.  Follow up needed: neurologist   Other Healthcare Providers Involved in Patient s Care:         None  Update since discharge: stable.     MED REC REQUIRED  Post Medication Reconciliation Status: discharge medications reconciled, continue medications without change     Plan of care communicated with patient and family             Patient Active Problem List   Diagnosis    Essential hypertension, benign    H/O: hysterectomy    Primary osteoarthritis of both knees    Morbid  "obesity (H)    Non-compliance with treatment    Cerebrovascular accident (CVA), unspecified mechanism (H)    Prediabetes      Social History     Social History Narrative    Not on file      Current Outpatient Medications   Medication Sig Dispense Refill    aspirin 81 MG EC tablet Take 1 tablet (81 mg) by mouth daily. 30 tablet 0    atenolol-chlorthalidone (TENORETIC) 100-25 MG tablet Take 1 tablet by mouth daily. 90 tablet 4    atorvastatin (LIPITOR) 20 MG tablet Take 20 mg by mouth daily.      clopidogrel (PLAVIX) 75 MG tablet Take 1 tablet (75 mg) by mouth or NG Tube daily. 21 tablet 0    vitamin D3 (CHOLECALCIFEROL) 50 mcg (2000 units) tablet Take 1 tablet (50 mcg) by mouth daily 90 tablet 1    RSV vaccine, bivalent, ABRYSVO, injection Inject 0.5 mLs into the muscle once for 1 dose. Pharmacist administered 0.5 mL 0    Tdap, tetanus-diptheria-acell pertussis, (BOOSTRIX) 5-2.5-18.5 LF-MCG/0.5 AMANDA injection Inject 0.5 mLs into the muscle once for 1 dose. 0.5 mL 0     No current facility-administered medications for this visit.            Review of Systems   Constitutional, HEENT, cardiovascular, pulmonary, GI, , musculoskeletal, neuro, skin, endocrine and psych systems are negative, except as otherwise noted.      Wt Readings from Last 3 Encounters:   09/11/24 93.8 kg (206 lb 14.4 oz)   09/02/24 92.9 kg (204 lb 12.9 oz)   10/09/23 89.8 kg (198 lb)      Objective      BP (!) 156/101   Pulse 69   Temp 97.3  F (36.3  C) (Temporal)   Ht 1.473 m (4' 10\")   Wt 93.8 kg (206 lb 14.4 oz)   SpO2 98%   BMI 43.24 kg/m     Physical Exam   GENERAL: alert and no distress  NECK: no adenopathy, no asymmetry, masses, or scars  RESP: lungs clear to auscultation - no rales, rhonchi or wheezes  CV: regular rate and rhythm, normal S1 S2, no S3 or S4, no murmur, click or rub, no peripheral edema  ABDOMEN: soft, nontender, no hepatosplenomegaly, no masses and bowel sounds normal  MS: no gross musculoskeletal defects noted, no " edema  NEURO: weakness of right hand and arm , sensory exam grossly normal, mentation intact, and gait normal including heel/toe/tandem walking    Admission on 09/02/2024, Discharged on 09/04/2024   Component Date Value Ref Range Status    GLUCOSE BY METER POCT 09/02/2024 96  70 - 99 mg/dL Final    Sodium 09/02/2024 139  135 - 145 mmol/L Final    Potassium 09/02/2024 3.8  3.4 - 5.3 mmol/L Final    Chloride 09/02/2024 103  98 - 107 mmol/L Final    Carbon Dioxide (CO2) 09/02/2024 25  22 - 29 mmol/L Final    Anion Gap 09/02/2024 11  7 - 15 mmol/L Final    Urea Nitrogen 09/02/2024 13.8  8.0 - 23.0 mg/dL Final    Creatinine 09/02/2024 0.78  0.51 - 0.95 mg/dL Final    GFR Estimate 09/02/2024 81  >60 mL/min/1.73m2 Final    eGFR calculated using 2021 CKD-EPI equation.    Calcium 09/02/2024 9.1  8.8 - 10.4 mg/dL Final    Reference intervals for this test were updated on 7/16/2024 to reflect our healthy population more accurately. There may be differences in the flagging of prior results with similar values performed with this method. Those prior results can be interpreted in the context of the updated reference intervals.    Glucose 09/02/2024 100 (H)  70 - 99 mg/dL Final    Ventricular Rate 09/02/2024 71  BPM Final    Atrial Rate 09/02/2024 71  BPM Final    NC Interval 09/02/2024 182  ms Final    QRS Duration 09/02/2024 92  ms Final    QT 09/02/2024 416  ms Final    QTc 09/02/2024 452  ms Final    P Axis 09/02/2024 63  degrees Final    R AXIS 09/02/2024 -33  degrees Final    T Axis 09/02/2024 49  degrees Final    Interpretation ECG 09/02/2024    Final                    Value:Sinus rhythm  Left axis deviation  Pulmonary disease pattern  Abnormal ECG  No previous ECGs available  Confirmed by SEE ED PROVIDER NOTE FOR, ECG INTERPRETATION ([id number]),  JERRY HOWARD ([id number]) on 9/4/2024 11:06:56 PM      WBC Count 09/02/2024 5.6  4.0 - 11.0 10e3/uL Final    RBC Count 09/02/2024 4.36  3.80 - 5.20 10e6/uL Final    Hemoglobin  09/02/2024 14.0  11.7 - 15.7 g/dL Final    Hematocrit 09/02/2024 42.3  35.0 - 47.0 % Final    MCV 09/02/2024 97  78 - 100 fL Final    MCH 09/02/2024 32.1  26.5 - 33.0 pg Final    MCHC 09/02/2024 33.1  31.5 - 36.5 g/dL Final    RDW 09/02/2024 13.4  10.0 - 15.0 % Final    Platelet Count 09/02/2024 207  150 - 450 10e3/uL Final    % Neutrophils 09/02/2024 54  % Final    % Lymphocytes 09/02/2024 36  % Final    % Monocytes 09/02/2024 9  % Final    % Eosinophils 09/02/2024 1  % Final    % Basophils 09/02/2024 1  % Final    % Immature Granulocytes 09/02/2024 0  % Final    NRBCs per 100 WBC 09/02/2024 0  <1 /100 Final    Absolute Neutrophils 09/02/2024 3.0  1.6 - 8.3 10e3/uL Final    Absolute Lymphocytes 09/02/2024 2.0  0.8 - 5.3 10e3/uL Final    Absolute Monocytes 09/02/2024 0.5  0.0 - 1.3 10e3/uL Final    Absolute Eosinophils 09/02/2024 0.1  0.0 - 0.7 10e3/uL Final    Absolute Basophils 09/02/2024 0.0  0.0 - 0.2 10e3/uL Final    Absolute Immature Granulocytes 09/02/2024 0.0  <=0.4 10e3/uL Final    Absolute NRBCs 09/02/2024 0.0  10e3/uL Final    Troponin T, High Sensitivity 09/02/2024 14  <=14 ng/L Final    Either a High Sensitivity Troponin T baseline (0 hours) value = 100 ng/L, or an increase in High Sensitivity Troponin T = 7 ng/L at 2 hours compared to 0 hours (2-0 hours), suggests myocardial injury, and urgent clinical attention is required.    If the 2-0 hours increase is <7 ng/L, a High Sensitivity Troponin T result above gender-specific reference ranges warrants further evaluation.   Recommendations for further evaluation include correlation with clinical decision-making tool (e.g., HEART), a 3rd High Sensitivity Troponin T test 2 hours after the 2nd (a 20% change from baseline would represent concern), admission for observation, close PCC/cardiology follow-up, or urgent outpatient provocative testing.    Hemoglobin A1C 09/02/2024 6.1 (H)  <5.7 % Final    Normal <5.7%   Prediabetes 5.7-6.4%    Diabetes 6.5% or  higher     Note: Adopted from ADA consensus guidelines.    Cholesterol 09/02/2024 174  <200 mg/dL Final    Triglycerides 09/02/2024 90  <150 mg/dL Final    Direct Measure HDL 09/02/2024 65  >=50 mg/dL Final    LDL Cholesterol Calculated 09/02/2024 91  <=100 mg/dL Final    Non HDL Cholesterol 09/02/2024 109  <130 mg/dL Final    LVEF  09/03/2024 > 65%   Final    GLUCOSE BY METER POCT 09/02/2024 98  70 - 99 mg/dL Final    Hold Specimen 09/02/2024 JIC   Final    GLUCOSE BY METER POCT 09/02/2024 162 (H)  70 - 99 mg/dL Final    WBC Count 09/03/2024 6.8  4.0 - 11.0 10e3/uL Final    RBC Count 09/03/2024 4.50  3.80 - 5.20 10e6/uL Final    Hemoglobin 09/03/2024 14.5  11.7 - 15.7 g/dL Final    Hematocrit 09/03/2024 43.0  35.0 - 47.0 % Final    MCV 09/03/2024 96  78 - 100 fL Final    MCH 09/03/2024 32.2  26.5 - 33.0 pg Final    MCHC 09/03/2024 33.7  31.5 - 36.5 g/dL Final    RDW 09/03/2024 13.3  10.0 - 15.0 % Final    Platelet Count 09/03/2024 204  150 - 450 10e3/uL Final    Sodium 09/03/2024 139  135 - 145 mmol/L Final    Potassium 09/03/2024 3.8  3.4 - 5.3 mmol/L Final    Chloride 09/03/2024 103  98 - 107 mmol/L Final    Carbon Dioxide (CO2) 09/03/2024 24  22 - 29 mmol/L Final    Anion Gap 09/03/2024 12  7 - 15 mmol/L Final    Urea Nitrogen 09/03/2024 12.5  8.0 - 23.0 mg/dL Final    Creatinine 09/03/2024 0.74  0.51 - 0.95 mg/dL Final    GFR Estimate 09/03/2024 86  >60 mL/min/1.73m2 Final    eGFR calculated using 2021 CKD-EPI equation.    Calcium 09/03/2024 9.3  8.8 - 10.4 mg/dL Final    Reference intervals for this test were updated on 7/16/2024 to reflect our healthy population more accurately. There may be differences in the flagging of prior results with similar values performed with this method. Those prior results can be interpreted in the context of the updated reference intervals.    Glucose 09/03/2024 125 (H)  70 - 99 mg/dL Final    GLUCOSE BY METER POCT 09/03/2024 159 (H)  70 - 99 mg/dL Final    GLUCOSE BY METER POCT  09/03/2024 121 (H)  70 - 99 mg/dL Final    GLUCOSE BY METER POCT 09/03/2024 94  70 - 99 mg/dL Final    GLUCOSE BY METER POCT 09/04/2024 115 (H)  70 - 99 mg/dL Final    GLUCOSE BY METER POCT 09/04/2024 105 (H)  70 - 99 mg/dL Final       Taylor Wilkins MD 9/11/2024    [facility].  [phone]

## 2024-09-11 NOTE — PATIENT INSTRUCTIONS
Make sure take blood pressure medicine- atenolol+chlorthalidone  daily even when blood pressure is normal ( normal because you taking the medicine )  Take Asprin  daily forever and plavix for 21 days   Lipitor 20 mg daily forever

## 2024-10-18 ENCOUNTER — OFFICE VISIT (OUTPATIENT)
Dept: FAMILY MEDICINE | Facility: CLINIC | Age: 69
End: 2024-10-18
Payer: COMMERCIAL

## 2024-10-18 VITALS
TEMPERATURE: 97.2 F | DIASTOLIC BLOOD PRESSURE: 84 MMHG | OXYGEN SATURATION: 97 % | RESPIRATION RATE: 14 BRPM | SYSTOLIC BLOOD PRESSURE: 136 MMHG | HEIGHT: 57 IN | WEIGHT: 200.4 LBS | HEART RATE: 58 BPM | BODY MASS INDEX: 43.23 KG/M2

## 2024-10-18 DIAGNOSIS — Z23 NEED FOR SHINGLES VACCINE: ICD-10-CM

## 2024-10-18 DIAGNOSIS — Z29.11 NEED FOR VACCINATION AGAINST RESPIRATORY SYNCYTIAL VIRUS: ICD-10-CM

## 2024-10-18 DIAGNOSIS — Z00.00 ENCOUNTER FOR MEDICARE ANNUAL WELLNESS EXAM: Primary | ICD-10-CM

## 2024-10-18 DIAGNOSIS — Z78.0 MENOPAUSE PRESENT: ICD-10-CM

## 2024-10-18 DIAGNOSIS — R73.03 PREDIABETES: ICD-10-CM

## 2024-10-18 DIAGNOSIS — Z23 NEED FOR TDAP VACCINATION: ICD-10-CM

## 2024-10-18 DIAGNOSIS — I10 ESSENTIAL HYPERTENSION, BENIGN: ICD-10-CM

## 2024-10-18 DIAGNOSIS — I63.9 CEREBROVASCULAR ACCIDENT (CVA), UNSPECIFIED MECHANISM (H): ICD-10-CM

## 2024-10-18 DIAGNOSIS — Z12.11 SCREEN FOR COLON CANCER: ICD-10-CM

## 2024-10-18 DIAGNOSIS — Z91.199 NON-COMPLIANCE WITH TREATMENT: ICD-10-CM

## 2024-10-18 DIAGNOSIS — E66.01 MORBID OBESITY (H): ICD-10-CM

## 2024-10-18 DIAGNOSIS — M17.0 PRIMARY OSTEOARTHRITIS OF BOTH KNEES: ICD-10-CM

## 2024-10-18 PROCEDURE — G0438 PPPS, INITIAL VISIT: HCPCS | Performed by: FAMILY MEDICINE

## 2024-10-18 PROCEDURE — 99213 OFFICE O/P EST LOW 20 MIN: CPT | Mod: 25 | Performed by: FAMILY MEDICINE

## 2024-10-18 PROCEDURE — 90480 ADMN SARSCOV2 VAC 1/ONLY CMP: CPT | Performed by: FAMILY MEDICINE

## 2024-10-18 PROCEDURE — 91320 SARSCV2 VAC 30MCG TRS-SUC IM: CPT | Performed by: FAMILY MEDICINE

## 2024-10-18 RX ORDER — ASPIRIN 81 MG/1
81 TABLET ORAL DAILY
Qty: 30 TABLET | Refills: 0 | Status: SHIPPED | OUTPATIENT
Start: 2024-10-18 | End: 2024-10-29

## 2024-10-18 RX ORDER — ATORVASTATIN CALCIUM 20 MG/1
20 TABLET, FILM COATED ORAL DAILY
Qty: 90 TABLET | Refills: 4 | Status: SHIPPED | OUTPATIENT
Start: 2024-10-18

## 2024-10-18 RX ORDER — ATENOLOL AND CHLORTHALIDONE TABLET 100; 25 MG/1; MG/1
1 TABLET ORAL DAILY
Qty: 90 TABLET | Refills: 4 | Status: SHIPPED | OUTPATIENT
Start: 2024-10-18

## 2024-10-18 SDOH — HEALTH STABILITY: PHYSICAL HEALTH: ON AVERAGE, HOW MANY DAYS PER WEEK DO YOU ENGAGE IN MODERATE TO STRENUOUS EXERCISE (LIKE A BRISK WALK)?: 7 DAYS

## 2024-10-18 ASSESSMENT — SOCIAL DETERMINANTS OF HEALTH (SDOH): HOW OFTEN DO YOU GET TOGETHER WITH FRIENDS OR RELATIVES?: ONCE A WEEK

## 2024-10-18 NOTE — PROGRESS NOTES
Preventive Care Visit  [facility] ISRAEL Wilkins MD, Family Medicine  Oct 18, 2024      Assessment & Plan     Konrad was seen today for wellness visit.    Diagnoses and all orders for this visit:    Encounter for Medicare annual wellness exam    Cerebrovascular accident (CVA), unspecified mechanism (H)  Comments:  education provided on baby ASA daily, finished plavix for 21 days  Orders:  -     aspirin 81 MG EC tablet; Take 1 tablet (81 mg) by mouth daily.  -     atenolol-chlorthalidone (TENORETIC) 100-25 MG tablet; Take 1 tablet by mouth daily.  -     atorvastatin (LIPITOR) 20 MG tablet; Take 1 tablet (20 mg) by mouth daily.    Essential hypertension, benign  Comments:  at home readings 130-140/70-80 Blood pressure is well controlled on current dose we will refill the medication patient currently denies any side effect  Orders:  -     aspirin 81 MG EC tablet; Take 1 tablet (81 mg) by mouth daily.  -     atenolol-chlorthalidone (TENORETIC) 100-25 MG tablet; Take 1 tablet by mouth daily.    Morbid obesity (H)  -     aspirin 81 MG EC tablet; Take 1 tablet (81 mg) by mouth daily.    Need for shingles vaccine  -     zoster vaccine recombinant adjuvanted (SHINGRIX) injection; Inject 0.5 mLs into the muscle once for 1 dose. Pharmacist administered    Need for Tdap vaccination  -     Tdap, tetanus-diptheria-acell pertussis, (BOOSTRIX) 5-2.5-18.5 LF-MCG/0.5 AMANDA injection; Inject 0.5 mLs into the muscle once for 1 dose.    Need for vaccination against respiratory syncytial virus  -     RSV vaccine, bivalent, ABRYSVO, injection; Inject 0.5 mLs into the muscle once for 1 dose. Pharmacist administered    Screen for colon cancer  -     Colonoscopy Screening  Referral; Future    Prediabetes    Primary osteoarthritis of both knees  -     aspirin 81 MG EC tablet; Take 1 tablet (81 mg) by mouth daily.    Non-compliance with treatment  -     aspirin 81 MG EC tablet; Take 1 tablet (81 mg) by mouth  [Father] : father "daily.    Menopause present  -     DEXA HIP/PELVIS/SPINE - Future; Future    Cerebrovascular accident (CVA), unspecified mechanism (H)  -     aspirin 81 MG EC tablet; Take 1 tablet (81 mg) by mouth daily.  -     atenolol-chlorthalidone (TENORETIC) 100-25 MG tablet; Take 1 tablet by mouth daily.  -     atorvastatin (LIPITOR) 20 MG tablet; Take 1 tablet (20 mg) by mouth daily.    Essential hypertension, benign  -     aspirin 81 MG EC tablet; Take 1 tablet (81 mg) by mouth daily.  -     atenolol-chlorthalidone (TENORETIC) 100-25 MG tablet; Take 1 tablet by mouth daily.    Other orders  -     PRIMARY CARE FOLLOW-UP SCHEDULING; Future  -     COVID-19 12+ (PFIZER); Future  -     COVID-19 12+ (PFIZER)     Patient instructions :  Make sure take baby Asprin daily   Take your cholesterol medicine lipitor and blood pressure medicine daily   Follow up 6 month     Taylor Wilkins MD   Patient has been advised of split billing requirements and indicates understanding: Yes        BMI 43:  Wt Readings from Last 4 Encounters:   10/18/24 90.9 kg (200 lb 6.4 oz)   09/11/24 93.8 kg (206 lb 14.4 oz)   09/02/24 92.9 kg (204 lb 12.9 oz)   10/09/23 89.8 kg (198 lb)      Estimated body mass index is 43.37 kg/m  as calculated from the following:    Height as of this encounter: 1.448 m (4' 9\").    Weight as of this encounter: 90.9 kg (200 lb 6.4 oz).   Weight management plan: Discussed healthy diet and exercise guidelines    Counseling  Appropriate preventive services were addressed with this patient via screening, questionnaire, or discussion as appropriate for fall prevention, nutrition, physical activity, Tobacco-use cessation, social engagement, weight loss and cognition.  Checklist reviewing preventive services available has been given to the patient.  Reviewed patient's diet, addressing concerns and/or questions.   The patient was instructed to see the dentist every 6 months.       Ashutosh Silvestre is a 69 year old, presenting for the " following:  Wellness Visit ([facility])        10/18/2024    10:01 AM   Additional Questions   Roomed by SEE JANE   Accompanied by [facility] Care Directive  Patient does not have a Health Care Directive or Living Will: Discussed advance care planning with patient; information given to patient to review.    HPI    Was admitted to the hospital 9/2-9/4 for subacute stroke, arm weakness improving , denies any new symptoms , did able to review hospital discharge mendoza Mcarthur is a 69-year-old woman with history of hypertension and hyperlipidemia admitted on 9/2/2024 with right arm weakness and found to have subacute stroke in left posterior frontal centrum semiovale and right ICA pseudoaneurysm.   Brain MRI showed small patchy focus of acute infarction in the left posterior frontal centrum semiovale and 5 mm saccular pseudoaneurysm of right ICA, as well as chronic microhemorrhage of right thalamic nucleus.   Echocardiogram was negative for PFO.  Neurologist recommends dual antiplatelet therapy with aspirin and Plavix for 21 days followed by aspirin monotherapy afterwards with plan to follow-up at the neurology clinic as outpatient.   Symptoms have resolved and patient is clinically stable for discharge at this time.     Hyperlipidemia Follow-Up    Are you regularly taking any medication or supplement to lower your cholesterol?   Yes- lipitor  Are you having muscle aches or other side effects that you think could be caused by your cholesterol lowering medication?  No    Hypertension Follow-up    Do you check your blood pressure regularly outside of the clinic? Yes   Are you following a low salt diet? Yes  Are your blood pressures ever more than 140 on the top number (systolic) OR more   than 90 on the bottom number (diastolic), for example 140/90? No        10/18/2024   General Health   How would you rate your overall physical health? Excellent   Feel stress (tense, anxious, or unable to sleep) Not  at all            10/18/2024   Nutrition   Diet: Low salt            10/18/2024   Exercise   Days per week of moderate/strenous exercise 7 days            10/18/2024   Social Factors   Frequency of gathering with friends or relatives Once a week   Worry food won't last until get money to buy more No   Food not last or not have enough money for food? No   Do you have housing? (Housing is defined as stable permanent housing and does not include staying ouside in a car, in a tent, in an abandoned building, in an overnight shelter, or couch-surfing.) Yes   Are you worried about losing your housing? No   Lack of transportation? No   Unable to get utilities (heat,electricity)? No            10/18/2024   Fall Risk   Fallen 2 or more times in the past year? No   Trouble with walking or balance? No             10/18/2024   Activities of Daily Living- Home Safety   Needs help with the following daily activites None of the above   Safety concerns in the home None of the above            10/18/2024   Dental   Dentist two times every year? (!) NO            10/18/2024   Hearing Screening   Hearing concerns? None of the above            10/18/2024   Driving Risk Screening   Patient/family members have concerns about driving No            10/18/2024   General Alertness/Fatigue Screening   Have you been more tired than usual lately? No            10/18/2024   Urinary Incontinence Screening   Bothered by leaking urine in past 6 months No            10/18/2024   TB Screening   Were you born outside of the [place]? No            Today's PHQ-2 Score:       10/18/2024    10:01 AM   PHQ-2 ( 1999 Pfizer)   Q1: Little interest or pleasure in doing things 0   Q2: Feeling down, depressed or hopeless 0   PHQ-2 Score 0   Q1: Little interest or pleasure in doing things Not at all   Q2: Feeling down, depressed or hopeless Not at all   PHQ-2 Score 0           10/18/2024   Substance Use   Alcohol more than 3/day or more than 7/wk Not Applicable   Do  you have a current opioid prescription? No   How severe/bad is pain from 1 to 10? 0/10 (No Pain)   Do you use any other substances recreationally? No        Social History     Tobacco Use    Smoking status: Never     Passive exposure: Never    Smokeless tobacco: Never   Vaping Use    Vaping status: Never Used          Mammogram Screening - Mammogram every 1-2 years updated in Health Maintenance based on mutual decision making    ASCVD Risk   The ASCVD Risk score (Tanmay CHADWICK, et al., 2019) failed to calculate for the following reasons:    The patient has a prior MI or stroke diagnosis    Fracture Risk Assessment Tool  Link to Frax Calculator  Use the information below to complete the Frax calculator  : 1955  Sex: female  Weight (kg): 90.9 kg (actual weight)  Height (cm): 144.8 cm  Previous Fragility Fracture:  No  History of parent with fractured hip:  No  Current Smoking:  No  Patient has been on glucocorticoids for more than 3 months (5mg/day or more): No  Rheumatoid Arthritis on Problem List:  No  Secondary Osteoporosis on Problem List:  No  Consumes 3 or more units of alcohol per day: No  Femoral Neck BMD (g/cm2)            Reviewed and updated as needed this visit by Provider   Tobacco  Allergies  Meds  Problems  Med Hx  Surg Hx  Fam Hx            OB History    Para Term  AB Living   3 3 3 0 0 0   SAB IAB Ectopic Multiple Live Births   0 0 0 0 0      # Outcome Date GA Lbr Dmitriy/2nd Weight Sex Type Anes PTL Lv   3 Term            2 Term            1 Term              Current providers sharing in care for this patient include:  Patient Care Team:  Taylor Wilkins MD as PCP - General (Family Medicine)  Taylor Wilkins MD as Assigned PCP    The following health maintenance items are reviewed in Epic and correct as of today:  Health Maintenance   Topic Date Due    DEXA  Never done    COLORECTAL CANCER SCREENING  Never done    ZOSTER IMMUNIZATION (1 of 2) Never done    RSV VACCINE (1 -  "Risk 60-74 years 1-dose series) Never done    DTAP/TDAP/TD IMMUNIZATION (2 - Td or Tdap) 11/19/2019    LIPID  09/02/2025    BMP  09/03/2025    ANNUAL REVIEW OF HM ORDERS  09/11/2025    MEDICARE ANNUAL WELLNESS VISIT  10/18/2025    FALL RISK ASSESSMENT  10/18/2025    GLUCOSE  09/04/2027    ADVANCE CARE PLANNING  10/18/2029    HEPATITIS C SCREENING  Completed    PHQ-2 (once per calendar year)  Completed    Pneumococcal Vaccine: 65+ Years  Completed    COVID-19 Vaccine  Completed    HPV IMMUNIZATION  Aged Out    MENINGITIS IMMUNIZATION  Aged Out    RSV MONOCLONAL ANTIBODY  Aged Out    MAMMO SCREENING  Discontinued    INFLUENZA VACCINE  Discontinued         Review of Systems  Constitutional, HEENT, cardiovascular, pulmonary, GI, , musculoskeletal, neuro, skin, endocrine and psych systems are negative, except as otherwise noted.     Objective    Exam  /84   Pulse 58   Temp 97.2  F (36.2  C)   Resp 14   Ht 1.448 m (4' 9\")   Wt 90.9 kg (200 lb 6.4 oz)   SpO2 97%   BMI 43.37 kg/m     Estimated body mass index is 43.37 kg/m  as calculated from the following:    Height as of this encounter: 1.448 m (4' 9\").    Weight as of this encounter: 90.9 kg (200 lb 6.4 oz).    Physical Exam  GENERAL: alert and no distress  EYES: Eyes grossly normal to inspection, PERRL and conjunctivae and sclerae normal  HENT: ear canals and TM's normal, nose and mouth without ulcers or lesions  NECK: no adenopathy, no asymmetry, masses, or scars  RESP: lungs clear to auscultation - no rales, rhonchi or wheezes  CV: regular rate and rhythm, normal S1 S2, no S3 or S4, no murmur, click or rub, no peripheral edema  MS: no gross musculoskeletal defects noted, no edema  NEURO: Normal strength and tone, mentation intact and speech normal         10/18/2024   Mini Cog   Clock Draw Score 0 Abnormal   3 Item Recall 2 objects recalled   Mini Cog Total Score 2                 Signed Electronically by: Taylor Wilkins MD    "

## 2024-10-18 NOTE — PATIENT INSTRUCTIONS
"Make sure take baby Asprin daily   Take your cholesterol medicine lipitor and blood pressure medicine daily   Follow up 6 month     Taylor Wilkins MD       Patient Education   Minerva Maria Fernanda Xeeb Saib Xyuas Kom Tiv Thaiv Tus Kheej  Nov yog ib co minerva maria fernanda xeeb uas peb yeej meem muab rere tib neeg pab lawv noj qab nyob zoo. Jose Carlos zaum koj pab pawg saib xyuas yuav muaj ib co minerva maria fernanda xeeb uas tshwj xeeb rere koj nkaus xwb. Thov nrog koj pab pawg saib xyuas sib sharda txog jose carlos yam uas koj toob ricardo kom tiv thaiv koj tus kheej.  Rosa M Coj Lub Neej  Es xaws xais ntau olga lidia 150 feeb txhua lub arreguin tiam (30 feeb txhua hnub, 5 hnub ib lub arreguin tiam).  Ua yam pab cov leeg muaj zog tuaj 2 zaug txhua lub arreguin tiam. Jose Carlos yam no pab koj tswj hwm koj lub cev qhov hnyav thiab tiv thaiv ntawm kab mob.  Txhob haus luam yeeb.  Pleev tshuaj tiv thaiv tshav kub kom thiaj tsis raug mob khees xaws ntawm nqaij tawv.  Txhua 2 mus rere 5 xyoos yuav tau kuaj koj lub tsev rere qhov radon. Radon yog ib philip julio uas tsis muaj xim tsis muaj ntxhiab uas mob tau koj cov ntsws. Kom thiaj kawm ntxiv, mus saib [email or web address] thiab ntaus ntawv \"Radon in Homes.\"  Ceev cov phom kom tsis muaj mos txwv rere hauv thiab muab xauv kishan hauv ib qho chaw nyab xeeb xws li lub txhoj, los yog muab xauv kishan thiab muab cov yawm sij zais kishan. Muab cov mos txwv xauv rere hauv lwm qhov chaw nrug cov phom. Kom thiaj kawm ntxiv, mus saib [email or web address] thiab ntaus ntawv \"safe gun storage.\"  Rosa M Noj Qab Huv  Noj 5 qho txiv hmab txiv ntoo thiab zaub txhua hnub.  Noj nplem nplej, txhuv xim av thiab cov fawm muaj nplej (los theej nplem dawb, txhuv dawb, thiab fawm dawb).  Ua tib zoo noj calcium thiab vitamin D ntau. Saib cov ntawv lo rere pob khoom noj thiab siv zog noj kom txog 100% RDA (qhov ntau hauv ib hnub).  Luis Daniel meem kuaj ntsuas  Txhua 6 lub hli mus kuaj hniav thiab muab tu.  Txhua xyoo mus saib koj pab pawg saib xyuas rosa m noj qab nyob zoo kom sib sharda txog:  Ib yam dab tsi uas hloov ntawm koj " txoj rosa m noj qab nyob zoo.  Cov tshuaj uas koj pab pawg tau hais kom siv.  Rosa M saib xyuas kom tiv thaiv, rosa m npaj rere tsev neeg, thiab yuav ua li gaby tiv thaiv ntawm kab mob uas ntev.  Rosa M txhaj tshuaj (koob tshuaj tiv thaiv)   Cov koob tshuaj HPV (txog thaum muaj 26 xyoo), yog koj yeej tsis tau txais olga lidia li.  Cov koob tshuaj Hepatitis B Kab Mob Siab (txog thaum muaj 59 xyoos), yog koj yeej tsis tau txais olga lidia li.  Koob tshuaj COVID-19: Txais koob tshuaj no thaum txog caij txais.  Koob tshuaj tiv thaiv ntawm mob khaub thuas: Txais txhua xyoo.  Koob tshuaj tiv thaiv mob daig tsaig: Txais txhua 10 xyoo.  Pneumococcal, hepatitis A, thiab RSV cov koob tshuaj: Nug koj pab pawg saib xyuas zoie puas tsim nyog rere koj txais cov no raws li koj qhov pheej hmoo.  Koob tshuaj tiv thaiv ntawm kab mob sawv hlwv (rere cov muaj 50 xyoo rov amee).  Rosa M kuaj ntsuas rere rosa m noj qab nyob zoo  Kuaj mob ntshav qab zib:  Pib thaum muaj 35 xyoos, Mus kuaj mob ntshav qab zib txhua 3 xyoos los yog ntau zog.  Yog koj tseem tsis tau txog 33 xyoos, nug koj pab pawg saib xyuas zoie puas tsim nyog rere koj kuaj mob ntshav qab zib.  Kuaj cholesterol: Thaum muaj 37 xyoo, pib kuaj cholesterol txhua 5 xyoos, los yog ntau olga lidia ntawd yog kws marilia mob qhia.  Kuaj txha qhov tuab (DEXA): Thaum txog 48 xyoo lawd, nug koj pab pawg saib xyuas zoie puas tsim nyog rere koj kuaj txha zoie puas ruaj.  Kab Mob Siab Hepatitis C: Kuaj ib zaug hauv koj lub neej.  Kuaj Txoj Hlab Ntshav Hauv Plab: Nrog koj tus kws marilia mob sharda txog rosa m kuaj ntsuas no yog koj:  Tau haus luam yeeb ib zaug li; thiab  Yog txiv neej; thiab  Nruab hnub nyoog 65 thiab 75.  Mob Ricardo Cees (kis mob dhau ntawm rosa m sib deev)  Ua ntej muaj 24 xyoos: Nug koj pab pawg saib xyuas zoie puas tsim nyog kuaj rere mob ricardo cees.  Ab qab muaj 24 xyoos: Mus kuaj rere mob ricardo cees yog koj muaj rosa m pheej hmoo. Koj muaj rosa m pheej hmoo rere mob ricardo cees (suav nrog HIV) yog:  Koj sib deev nrog ntau olga lidia ib tug neeg.  Koj tsis  siv cov hnab looj qau thaum sib deev.  Koj los yog koj tus khub deev kuaj pom tias muaj mob ricardo cees lawm.  Yog koj muaj rosa m pheej hmoo rere mob ricardo cees HIV, nug txog cov tshuaj PrEP kom tiv thaiv ntawm HIV.  Mus kuaj rere mob ricardo cees HIV yam ntau olga lidia ib zaug hauv koj lub neej, txawm yog koj muaj rosa m pheej hmoo rere mob ricardo cees HIV los tsis muaj los xij.  Kuaj rere mob khees xaws  Kuaj lub ncauj tsev me nyuam rere mob khees xaws: Yog koj muaj ib lub ncauj tsev me nyuam, rachel yuav tau ib sij kuaj lub ncauj tsev me nyuam seb puas muaj mob khees xaws pib thaum muaj 19 xyoos. Neeg feem coob uas ib sij kuaj lub ncauj tsev me nyuam thiab tsis pom dab tsi txawv lawv tsum tau nelli qab muaj 65 xyoos. Nrog koj tus kws marilia mob sib sharda txog qhov no.  Kuaj lub mis rere mob khees xaws (mammogram): Yog koj tau muaj mis olga lidia li, yuav tau ib sij kuaj lub mis pib thaum muaj 40 xyoo. Rosa M kuaj no yog kom saib seb puas muaj mob khees xaws hauv lub mis.  Kuaj Txoj Hnyuv Rere Mob Khees Xaws: Yeej tseem ceeb pib kuaj txoj hnyuv rere mob khees xaws pib thaum muaj 45 xyoos.  Txhua 10 xyoo yuav tau kuaj txoj hnyuv (los yog ntau zog yog koj muaj rosa m pheej hmoo) Los sis, nug koj tus kws marilia mob txog rosa m kuaj thooj quav FIT txhua xyoo los yog Cologuard txhua 1 xyoos.  Kom thiaj kawm ntxiv txog jose carlos philip kuaj no, mus saib: [email or web address].  Yog xav tau rosa m pab txog qhov no, mus saib: [email or web address].  Kuaj lub farooq kua phev (prostate) rere mob khees xaws: Yog koj muaj ib lub farooq kua phev (prostate) thiab nruab hnub nyoog 55 mus rere 69, nug koj tus kws marilia mob zoie puas tsim nyog rere koj kuaj lub farooq kua phev.  Kuaj ntsws rere mob khees xaws: Yog koj haus luam yeeb neely sim no los yog tau haus yav tas los uas muaj hnub nyoog nruab 50 xyoos mus rere 80 xyoo, nug koj pab pawg saib xyuas zoie puas tsim nyog rere koj yeej meem kuaj lub ntsws rere mob khees xaws.    Rere cov philip phiaj rosa m siv ua ntaub ntawv qhia nkaus xwb. Yuav tsis pauv rosa m qhia los ntawm  koj qhov chaw marilia mob. Copyright (lola lindsay)   2023 [facility].   Txhua txoj neftali raug tswj tseg . Tshcelsa bassett [facility] Transitions Program. [employer] [phone] - REV 04/24.

## 2024-10-28 DIAGNOSIS — I63.9 CEREBROVASCULAR ACCIDENT (CVA), UNSPECIFIED MECHANISM (H): ICD-10-CM

## 2024-10-28 DIAGNOSIS — I10 ESSENTIAL HYPERTENSION, BENIGN: ICD-10-CM

## 2024-10-28 RX ORDER — ATENOLOL AND CHLORTHALIDONE TABLET 100; 25 MG/1; MG/1
1 TABLET ORAL DAILY
Qty: 90 TABLET | Refills: 4 | OUTPATIENT
Start: 2024-10-28

## 2024-10-29 DIAGNOSIS — I63.9 CEREBROVASCULAR ACCIDENT (CVA), UNSPECIFIED MECHANISM (H): ICD-10-CM

## 2024-10-29 DIAGNOSIS — I10 ESSENTIAL HYPERTENSION, BENIGN: ICD-10-CM

## 2024-10-29 DIAGNOSIS — M17.0 PRIMARY OSTEOARTHRITIS OF BOTH KNEES: ICD-10-CM

## 2024-10-29 DIAGNOSIS — E66.01 MORBID OBESITY (H): ICD-10-CM

## 2024-10-29 DIAGNOSIS — Z91.199 NON-COMPLIANCE WITH TREATMENT: ICD-10-CM

## 2024-10-29 RX ORDER — SALICYLIC ACID 40 %
81 ADHESIVE PATCH, MEDICATED TOPICAL DAILY
Qty: 30 TABLET | Refills: 0 | Status: SHIPPED | OUTPATIENT
Start: 2024-10-29

## 2025-01-22 PROBLEM — I69.331: Status: ACTIVE | Noted: 2025-01-22

## 2025-03-16 DIAGNOSIS — Z91.199 NON-COMPLIANCE WITH TREATMENT: ICD-10-CM

## 2025-03-16 DIAGNOSIS — I10 ESSENTIAL HYPERTENSION, BENIGN: ICD-10-CM

## 2025-03-16 DIAGNOSIS — I63.9 CEREBROVASCULAR ACCIDENT (CVA), UNSPECIFIED MECHANISM (H): ICD-10-CM

## 2025-03-16 DIAGNOSIS — E66.01 MORBID OBESITY (H): ICD-10-CM

## 2025-03-16 DIAGNOSIS — M17.0 PRIMARY OSTEOARTHRITIS OF BOTH KNEES: ICD-10-CM

## 2025-03-17 NOTE — TELEPHONE ENCOUNTER
Clinic RN: Please investigate patient's chart or contact patient if the information cannot be found because patient should have run out of this medication on 11/29/24. Confirm patient is taking this medication as prescribed. Document findings and route refill encounter to provider for approval or denial.

## 2025-03-19 RX ORDER — SALICYLIC ACID 40 %
81 ADHESIVE PATCH, MEDICATED TOPICAL DAILY
Qty: 90 TABLET | Refills: 2 | Status: SHIPPED | OUTPATIENT
Start: 2025-03-19

## 2025-07-21 ENCOUNTER — OFFICE VISIT (OUTPATIENT)
Dept: FAMILY MEDICINE | Facility: CLINIC | Age: 70
End: 2025-07-21
Payer: COMMERCIAL

## 2025-07-21 VITALS
HEART RATE: 60 BPM | HEIGHT: 57 IN | TEMPERATURE: 97.3 F | SYSTOLIC BLOOD PRESSURE: 138 MMHG | OXYGEN SATURATION: 98 % | DIASTOLIC BLOOD PRESSURE: 86 MMHG | BODY MASS INDEX: 41.49 KG/M2 | RESPIRATION RATE: 18 BRPM | WEIGHT: 192.31 LBS

## 2025-07-21 DIAGNOSIS — R73.03 PREDIABETES: ICD-10-CM

## 2025-07-21 DIAGNOSIS — I69.331 MONOPLEGIA OF UPPER LIMB FOLLOWING CEREBRAL INFARCTION AFFECTING RIGHT DOMINANT SIDE (H): Primary | ICD-10-CM

## 2025-07-21 DIAGNOSIS — Z23 NEED FOR VACCINATION: ICD-10-CM

## 2025-07-21 DIAGNOSIS — Z91.199 NON-COMPLIANCE WITH TREATMENT: ICD-10-CM

## 2025-07-21 DIAGNOSIS — I63.9 CEREBROVASCULAR ACCIDENT (CVA), UNSPECIFIED MECHANISM (H): ICD-10-CM

## 2025-07-21 DIAGNOSIS — I10 ESSENTIAL HYPERTENSION, BENIGN: ICD-10-CM
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
## 2025-07-21 DIAGNOSIS — M17.0 PRIMARY OSTEOARTHRITIS OF BOTH KNEES: ICD-10-CM

## 2025-07-21 PROBLEM — E66.01 MORBID OBESITY (H): Status: RESOLVED | Noted: 2021-10-08 | Resolved: 2025-07-21

## 2025-07-21 LAB
EST. AVERAGE GLUCOSE BLD GHB EST-MCNC: 117 MG/DL
HBA1C MFR BLD: 5.7 % (ref 0–5.6)

## 2025-07-21 PROCEDURE — G2211 COMPLEX E/M VISIT ADD ON: HCPCS | Performed by: FAMILY MEDICINE

## 2025-07-21 PROCEDURE — 83036 HEMOGLOBIN GLYCOSYLATED A1C: CPT | Performed by: FAMILY MEDICINE

## 2025-07-21 PROCEDURE — 36415 COLL VENOUS BLD VENIPUNCTURE: CPT | Performed by: FAMILY MEDICINE

## 2025-07-21 PROCEDURE — 99213 OFFICE O/P EST LOW 20 MIN: CPT | Performed by: FAMILY MEDICINE

## 2025-07-21 RX ORDER — ACETAMINOPHEN 500 MG
500-1000 TABLET ORAL EVERY 6 HOURS PRN
Qty: 90 TABLET | Refills: 1 | Status: SHIPPED | OUTPATIENT
Start: 2025-07-21

## 2025-07-21 NOTE — PATIENT INSTRUCTIONS
Based on our discussion, I have outlined the following instructions for you:      - Keep taking your current medication for blood pressure as you have been doing.  - It's time to check your blood sugar level, so please make sure to get that done.  - You can get your vaccinations at the pharmacy where you usually  your medications.  - If you have pain, you can take acetaminophen. Take 500 mg, and you can take up to two tablets every 6 hours if needed.  - Physical therapy was suggested, but you chose not to do it. If you change your mind, you can consider it in the future.    Thank you again for your visit, and we look forward to supporting you in your journey to better health.

## 2025-07-21 NOTE — PROGRESS NOTES
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    Cerebrovascular accident (CVA), unspecified mechanism:  - History of stroke with previous right side weakness.    Essential hypertension, benign:  - Continue current blood pressure medication.    Prediabetes:  - Previously in prediabetes range.  - Blood sugar level to be checked as patient is due for it.    Need for vaccination:  - Vaccinations can be done at the pharmacy where routine medications are obtained.    Fall, accidental, initial encounter:  - Pain in the rib and lower back following a fall while trying to catch a baby.  - Prescribe acetaminophen 500 mg, up to two tablets as needed every 6 hours. Consider physical therapy, but patient declined.    Preventive care:  - Patient is due for Medicare preventive visit but is deferring at this time; will notify when ready.    Consent was obtained from the patient to use an AI documentation tool in the creation of this note.  The longitudinal plan of care for the diagnosis(es)/condition(s) as documented were addressed during this visit. Due to the added complexity in care, I will continue to support Konrad in the subsequent management and with ongoing continuity of care.  BMI  Estimated body mass index is 41.62 kg/m  as calculated from the following:    Height as of this encounter: 1.448 m (4' 9\").    Weight as of this encounter: 87.2 kg (192 lb 5 oz).   Weight management plan: Discussed healthy diet and exercise guidelines    Follow-up       Subjective   Konrad is a 70 year old, presenting for the following health issues:  Follow Up (Follow up stroke /Fell 3 weeks ago and hurt her ribs)        7/21/2025     9:30 AM   Additional Questions   Roomed by SYED Cobb   Accompanied by self     History of Present Illness       Reason for visit:  Checkup and injury  Symptom onset:  " 3-4 weeks ago   She is taking medications regularly.   Konrad Mcarthur, 70 years    Fall and Back/Chest Pain  - Fell approximately 3 weeks ago while trying to prevent her 5-month-old granddaughter from falling off the couch  - Landed face down on the carpet, with impact to the area under the breasts (ribs) and lower back  - Current pain localized under the breast (rib area) and lower back  - No pain or instability in the area prior to the fall  - No mention of hitting any object other than the carpet during the fall  - No reported weakness or loss of strength in any part of the body since the fall  - No use of cane or walker currently, and does not feel the need for one    Prediabetes  - Previously in prediabetes range for blood sugar  - No diagnosis of diabetes  - Reports eating healthier with less sugar, rice, and noodles since last evaluation    Misc  - No current issues or concerns other than those related to the fall and prediabetes, as reported during the visit      Hypertension Follow-up    Do you check your blood pressure regularly outside of the clinic? Yes   Are you following a low salt diet? Yes  Are your blood pressures ever more than 140 on the top number (systolic) OR more   than 90 on the bottom number (diastolic), for example 140/90? No    BP Readings from Last 2 Encounters:   07/21/25 138/86   10/18/24 136/84     Cerebrovascular Follow-up    Patient history: ischemic cerebrovascular incident  Residual symptoms: None  Worsened or new symptoms since last visit:  YES accidental fall some pain front rib area and upper back .  Daily aspirin use: Yes  Hypertension controlled: Yes  How many servings of fruits and vegetables do you eat daily?  2-3  On average, how many sweetened beverages do you drink each day (Examples: soda, juice, sweet tea, etc.  Do NOT count diet or artificially sweetened beverages)?   1  How many days per week do you exercise enough to make your heart beat faster? 3 or less  How many  "minutes a day do you exercise enough to make your heart beat faster? 30 - 60  How many days per week do you miss taking your medication? 0        Review of Systems  Constitutional, HEENT, cardiovascular, pulmonary, GI, , neuro, skin, endocrine and psych systems are negative, except as otherwise noted.      Objective    /86   Pulse 60   Temp 97.3  F (36.3  C)   Resp 18   Ht 1.448 m (4' 9\")   Wt 87.2 kg (192 lb 5 oz)   SpO2 98%   BMI 41.62 kg/m    Body mass index is 41.62 kg/m .  Physical Exam   GENERAL: alert and no distress  EYES: Eyes grossly normal to inspection, PERRL and conjunctivae and sclerae normal  HENT: ear canals and TM's normal, nose and mouth without ulcers or lesions  NECK: no adenopathy, no asymmetry, masses, or scars  RESP: lungs clear to auscultation - no rales, rhonchi or wheezes  CV: regular rate and rhythm, normal S1 S2, no S3 or S4, no murmur, click or rub, no peripheral edema  MS: no gross musculoskeletal defects noted, no edema  MS: Range of motion at the shoulder is intact no bruising on both side of the ribs  Pain localized between the 2 scapular blades    Admission on 09/02/2024, Discharged on 09/04/2024   Component Date Value Ref Range Status    GLUCOSE BY METER POCT 09/02/2024 96  70 - 99 mg/dL Final    Sodium 09/02/2024 139  135 - 145 mmol/L Final    Potassium 09/02/2024 3.8  3.4 - 5.3 mmol/L Final    Chloride 09/02/2024 103  98 - 107 mmol/L Final    Carbon Dioxide (CO2) 09/02/2024 25  22 - 29 mmol/L Final    Anion Gap 09/02/2024 11  7 - 15 mmol/L Final    Urea Nitrogen 09/02/2024 13.8  8.0 - 23.0 mg/dL Final    Creatinine 09/02/2024 0.78  0.51 - 0.95 mg/dL Final    GFR Estimate 09/02/2024 81  >60 mL/min/1.73m2 Final    eGFR calculated using 2021 CKD-EPI equation.    Calcium 09/02/2024 9.1  8.8 - 10.4 mg/dL Final    Reference intervals for this test were updated on 7/16/2024 to reflect our healthy population more accurately. There may be differences in the flagging of " prior results with similar values performed with this method. Those prior results can be interpreted in the context of the updated reference intervals.    Glucose 09/02/2024 100 (H)  70 - 99 mg/dL Final    Ventricular Rate 09/02/2024 71  BPM Final    Atrial Rate 09/02/2024 71  BPM Final    TX Interval 09/02/2024 182  ms Final    QRS Duration 09/02/2024 92  ms Final    QT 09/02/2024 416  ms Final    QTc 09/02/2024 452  ms Final    P Axis 09/02/2024 63  degrees Final    R AXIS 09/02/2024 -33  degrees Final    T Axis 09/02/2024 49  degrees Final    Interpretation ECG 09/02/2024    Final                    Value:Sinus rhythm  Left axis deviation  Pulmonary disease pattern  Abnormal ECG  No previous ECGs available  Confirmed by SEE ED PROVIDER NOTE FOR, ECG INTERPRETATION ([id number]),  JERRY HOWARD ([id number]) on 9/4/2024 11:06:56 PM      WBC Count 09/02/2024 5.6  4.0 - 11.0 10e3/uL Final    RBC Count 09/02/2024 4.36  3.80 - 5.20 10e6/uL Final    Hemoglobin 09/02/2024 14.0  11.7 - 15.7 g/dL Final    Hematocrit 09/02/2024 42.3  35.0 - 47.0 % Final    MCV 09/02/2024 97  78 - 100 fL Final    MCH 09/02/2024 32.1  26.5 - 33.0 pg Final    MCHC 09/02/2024 33.1  31.5 - 36.5 g/dL Final    RDW 09/02/2024 13.4  10.0 - 15.0 % Final    Platelet Count 09/02/2024 207  150 - 450 10e3/uL Final    % Neutrophils 09/02/2024 54  % Final    % Lymphocytes 09/02/2024 36  % Final    % Monocytes 09/02/2024 9  % Final    % Eosinophils 09/02/2024 1  % Final    % Basophils 09/02/2024 1  % Final    % Immature Granulocytes 09/02/2024 0  % Final    NRBCs per 100 WBC 09/02/2024 0  <1 /100 Final    Absolute Neutrophils 09/02/2024 3.0  1.6 - 8.3 10e3/uL Final    Absolute Lymphocytes 09/02/2024 2.0  0.8 - 5.3 10e3/uL Final    Absolute Monocytes 09/02/2024 0.5  0.0 - 1.3 10e3/uL Final    Absolute Eosinophils 09/02/2024 0.1  0.0 - 0.7 10e3/uL Final    Absolute Basophils 09/02/2024 0.0  0.0 - 0.2 10e3/uL Final    Absolute Immature Granulocytes 09/02/2024 0.0   <=0.4 10e3/uL Final    Absolute NRBCs 09/02/2024 0.0  10e3/uL Final    Troponin T, High Sensitivity 09/02/2024 14  <=14 ng/L Final    Either a High Sensitivity Troponin T baseline (0 hours) value = 100 ng/L, or an increase in High Sensitivity Troponin T = 7 ng/L at 2 hours compared to 0 hours (2-0 hours), suggests myocardial injury, and urgent clinical attention is required.    If the 2-0 hours increase is <7 ng/L, a High Sensitivity Troponin T result above gender-specific reference ranges warrants further evaluation.   Recommendations for further evaluation include correlation with clinical decision-making tool (e.g., HEART), a 3rd High Sensitivity Troponin T test 2 hours after the 2nd (a 20% change from baseline would represent concern), admission for observation, close PCC/cardiology follow-up, or urgent outpatient provocative testing.    Hemoglobin A1C 09/02/2024 6.1 (H)  <5.7 % Final    Normal <5.7%   Prediabetes 5.7-6.4%    Diabetes 6.5% or higher     Note: Adopted from ADA consensus guidelines.    Cholesterol 09/02/2024 174  <200 mg/dL Final    Triglycerides 09/02/2024 90  <150 mg/dL Final    Direct Measure HDL 09/02/2024 65  >=50 mg/dL Final    LDL Cholesterol Calculated 09/02/2024 91  <=100 mg/dL Final    Non HDL Cholesterol 09/02/2024 109  <130 mg/dL Final    LVEF  09/03/2024 > 65%   Final    GLUCOSE BY METER POCT 09/02/2024 98  70 - 99 mg/dL Final    Hold Specimen 09/02/2024 JIC   Final    GLUCOSE BY METER POCT 09/02/2024 162 (H)  70 - 99 mg/dL Final    WBC Count 09/03/2024 6.8  4.0 - 11.0 10e3/uL Final    RBC Count 09/03/2024 4.50  3.80 - 5.20 10e6/uL Final    Hemoglobin 09/03/2024 14.5  11.7 - 15.7 g/dL Final    Hematocrit 09/03/2024 43.0  35.0 - 47.0 % Final    MCV 09/03/2024 96  78 - 100 fL Final    MCH 09/03/2024 32.2  26.5 - 33.0 pg Final    MCHC 09/03/2024 33.7  31.5 - 36.5 g/dL Final    RDW 09/03/2024 13.3  10.0 - 15.0 % Final    Platelet Count 09/03/2024 204  150 - 450 10e3/uL Final    Sodium  09/03/2024 139  135 - 145 mmol/L Final    Potassium 09/03/2024 3.8  3.4 - 5.3 mmol/L Final    Chloride 09/03/2024 103  98 - 107 mmol/L Final    Carbon Dioxide (CO2) 09/03/2024 24  22 - 29 mmol/L Final    Anion Gap 09/03/2024 12  7 - 15 mmol/L Final    Urea Nitrogen 09/03/2024 12.5  8.0 - 23.0 mg/dL Final    Creatinine 09/03/2024 0.74  0.51 - 0.95 mg/dL Final    GFR Estimate 09/03/2024 86  >60 mL/min/1.73m2 Final    eGFR calculated using 2021 CKD-EPI equation.    Calcium 09/03/2024 9.3  8.8 - 10.4 mg/dL Final    Reference intervals for this test were updated on 7/16/2024 to reflect our healthy population more accurately. There may be differences in the flagging of prior results with similar values performed with this method. Those prior results can be interpreted in the context of the updated reference intervals.    Glucose 09/03/2024 125 (H)  70 - 99 mg/dL Final    GLUCOSE BY METER POCT 09/03/2024 159 (H)  70 - 99 mg/dL Final    GLUCOSE BY METER POCT 09/03/2024 121 (H)  70 - 99 mg/dL Final    GLUCOSE BY METER POCT 09/03/2024 94  70 - 99 mg/dL Final    GLUCOSE BY METER POCT 09/04/2024 115 (H)  70 - 99 mg/dL Final    GLUCOSE BY METER POCT 09/04/2024 105 (H)  70 - 99 mg/dL Final           Signed Electronically by: Taylor Wilkins MD